# Patient Record
Sex: MALE | Race: WHITE | Employment: FULL TIME | URBAN - METROPOLITAN AREA
[De-identification: names, ages, dates, MRNs, and addresses within clinical notes are randomized per-mention and may not be internally consistent; named-entity substitution may affect disease eponyms.]

---

## 2020-09-16 ENCOUNTER — APPOINTMENT (OUTPATIENT)
Dept: GENERAL RADIOLOGY | Age: 40
DRG: 310 | End: 2020-09-16
Payer: COMMERCIAL

## 2020-09-16 ENCOUNTER — HOSPITAL ENCOUNTER (INPATIENT)
Age: 40
LOS: 1 days | Discharge: HOME | DRG: 310 | End: 2020-09-17
Attending: EMERGENCY MEDICINE | Admitting: FAMILY MEDICINE
Payer: COMMERCIAL

## 2020-09-16 PROBLEM — I48.91 ATRIAL FIBRILLATION WITH RAPID VENTRICULAR RESPONSE (HCC): Status: ACTIVE | Noted: 2020-09-16

## 2020-09-16 LAB
AMPHETAMINE SCREEN, URINE: NOT DETECTED
ANION GAP SERPL CALCULATED.3IONS-SCNC: 9 MMOL/L (ref 7–16)
BARBITURATE SCREEN URINE: NOT DETECTED
BASOPHILS ABSOLUTE: 0.05 E9/L (ref 0–0.2)
BASOPHILS RELATIVE PERCENT: 0.7 % (ref 0–2)
BENZODIAZEPINE SCREEN, URINE: NOT DETECTED
BUN BLDV-MCNC: 15 MG/DL (ref 6–20)
CALCIUM SERPL-MCNC: 9.3 MG/DL (ref 8.6–10.2)
CANNABINOID SCREEN URINE: NOT DETECTED
CHLORIDE BLD-SCNC: 105 MMOL/L (ref 98–107)
CO2: 24 MMOL/L (ref 22–29)
COCAINE METABOLITE SCREEN URINE: NOT DETECTED
CREAT SERPL-MCNC: 0.9 MG/DL (ref 0.7–1.2)
EKG ATRIAL RATE: 110 BPM
EKG Q-T INTERVAL: 306 MS
EKG QRS DURATION: 88 MS
EKG QTC CALCULATION (BAZETT): 460 MS
EKG R AXIS: 84 DEGREES
EKG T AXIS: 32 DEGREES
EKG VENTRICULAR RATE: 136 BPM
EOSINOPHILS ABSOLUTE: 0.09 E9/L (ref 0.05–0.5)
EOSINOPHILS RELATIVE PERCENT: 1.3 % (ref 0–6)
FENTANYL SCREEN, URINE: NOT DETECTED
GFR AFRICAN AMERICAN: >60
GFR NON-AFRICAN AMERICAN: >60 ML/MIN/1.73
GLUCOSE BLD-MCNC: 195 MG/DL (ref 74–99)
HCT VFR BLD CALC: 46.8 % (ref 37–54)
HEMOGLOBIN: 16.2 G/DL (ref 12.5–16.5)
IMMATURE GRANULOCYTES #: 0.02 E9/L
IMMATURE GRANULOCYTES %: 0.3 % (ref 0–5)
LYMPHOCYTES ABSOLUTE: 1.92 E9/L (ref 1.5–4)
LYMPHOCYTES RELATIVE PERCENT: 27.5 % (ref 20–42)
Lab: NORMAL
MAGNESIUM: 1.8 MG/DL (ref 1.6–2.6)
MCH RBC QN AUTO: 29.9 PG (ref 26–35)
MCHC RBC AUTO-ENTMCNC: 34.6 % (ref 32–34.5)
MCV RBC AUTO: 86.3 FL (ref 80–99.9)
METHADONE SCREEN, URINE: NOT DETECTED
MONOCYTES ABSOLUTE: 0.52 E9/L (ref 0.1–0.95)
MONOCYTES RELATIVE PERCENT: 7.4 % (ref 2–12)
NEUTROPHILS ABSOLUTE: 4.38 E9/L (ref 1.8–7.3)
NEUTROPHILS RELATIVE PERCENT: 62.8 % (ref 43–80)
OPIATE SCREEN URINE: NOT DETECTED
OXYCODONE URINE: NOT DETECTED
PDW BLD-RTO: 12 FL (ref 11.5–15)
PHENCYCLIDINE SCREEN URINE: NOT DETECTED
PHOSPHORUS: 4 MG/DL (ref 2.5–4.5)
PLATELET # BLD: 244 E9/L (ref 130–450)
PMV BLD AUTO: 11.4 FL (ref 7–12)
POTASSIUM REFLEX MAGNESIUM: 4 MMOL/L (ref 3.5–5)
PRO-BNP: 1509 PG/ML (ref 0–125)
RBC # BLD: 5.42 E12/L (ref 3.8–5.8)
SODIUM BLD-SCNC: 138 MMOL/L (ref 132–146)
TROPONIN: <0.01 NG/ML (ref 0–0.03)
TSH SERPL DL<=0.05 MIU/L-ACNC: 1.32 UIU/ML (ref 0.27–4.2)
WBC # BLD: 7 E9/L (ref 4.5–11.5)

## 2020-09-16 PROCEDURE — 99284 EMERGENCY DEPT VISIT MOD MDM: CPT

## 2020-09-16 PROCEDURE — 71045 X-RAY EXAM CHEST 1 VIEW: CPT

## 2020-09-16 PROCEDURE — 93005 ELECTROCARDIOGRAM TRACING: CPT | Performed by: EMERGENCY MEDICINE

## 2020-09-16 PROCEDURE — 83735 ASSAY OF MAGNESIUM: CPT

## 2020-09-16 PROCEDURE — 85025 COMPLETE CBC W/AUTO DIFF WBC: CPT

## 2020-09-16 PROCEDURE — 36415 COLL VENOUS BLD VENIPUNCTURE: CPT

## 2020-09-16 PROCEDURE — 2500000003 HC RX 250 WO HCPCS: Performed by: STUDENT IN AN ORGANIZED HEALTH CARE EDUCATION/TRAINING PROGRAM

## 2020-09-16 PROCEDURE — 96361 HYDRATE IV INFUSION ADD-ON: CPT

## 2020-09-16 PROCEDURE — 84100 ASSAY OF PHOSPHORUS: CPT

## 2020-09-16 PROCEDURE — 80048 BASIC METABOLIC PNL TOTAL CA: CPT

## 2020-09-16 PROCEDURE — 2580000003 HC RX 258: Performed by: FAMILY MEDICINE

## 2020-09-16 PROCEDURE — 80307 DRUG TEST PRSMV CHEM ANLYZR: CPT

## 2020-09-16 PROCEDURE — 99283 EMERGENCY DEPT VISIT LOW MDM: CPT

## 2020-09-16 PROCEDURE — 2060000000 HC ICU INTERMEDIATE R&B

## 2020-09-16 PROCEDURE — 84443 ASSAY THYROID STIM HORMONE: CPT

## 2020-09-16 PROCEDURE — 93005 ELECTROCARDIOGRAM TRACING: CPT | Performed by: INTERNAL MEDICINE

## 2020-09-16 PROCEDURE — 6370000000 HC RX 637 (ALT 250 FOR IP): Performed by: INTERNAL MEDICINE

## 2020-09-16 PROCEDURE — 2580000003 HC RX 258: Performed by: STUDENT IN AN ORGANIZED HEALTH CARE EDUCATION/TRAINING PROGRAM

## 2020-09-16 PROCEDURE — 84484 ASSAY OF TROPONIN QUANT: CPT

## 2020-09-16 PROCEDURE — 83880 ASSAY OF NATRIURETIC PEPTIDE: CPT

## 2020-09-16 PROCEDURE — APPSS60 APP SPLIT SHARED TIME 46-60 MINUTES: Performed by: NURSE PRACTITIONER

## 2020-09-16 PROCEDURE — 96374 THER/PROPH/DIAG INJ IV PUSH: CPT

## 2020-09-16 PROCEDURE — 99254 IP/OBS CNSLTJ NEW/EST MOD 60: CPT | Performed by: INTERNAL MEDICINE

## 2020-09-16 RX ORDER — SODIUM CHLORIDE 0.9 % (FLUSH) 0.9 %
10 SYRINGE (ML) INJECTION EVERY 12 HOURS SCHEDULED
Status: DISCONTINUED | OUTPATIENT
Start: 2020-09-16 | End: 2020-09-17 | Stop reason: HOSPADM

## 2020-09-16 RX ORDER — SODIUM CHLORIDE 0.9 % (FLUSH) 0.9 %
10 SYRINGE (ML) INJECTION PRN
Status: DISCONTINUED | OUTPATIENT
Start: 2020-09-16 | End: 2020-09-17 | Stop reason: HOSPADM

## 2020-09-16 RX ORDER — ACETAMINOPHEN 650 MG/1
650 SUPPOSITORY RECTAL EVERY 6 HOURS PRN
Status: DISCONTINUED | OUTPATIENT
Start: 2020-09-16 | End: 2020-09-17 | Stop reason: HOSPADM

## 2020-09-16 RX ORDER — 0.9 % SODIUM CHLORIDE 0.9 %
500 INTRAVENOUS SOLUTION INTRAVENOUS ONCE
Status: COMPLETED | OUTPATIENT
Start: 2020-09-16 | End: 2020-09-16

## 2020-09-16 RX ORDER — ACETAMINOPHEN 325 MG/1
650 TABLET ORAL EVERY 6 HOURS PRN
Status: DISCONTINUED | OUTPATIENT
Start: 2020-09-16 | End: 2020-09-17 | Stop reason: HOSPADM

## 2020-09-16 RX ORDER — POLYETHYLENE GLYCOL 3350 17 G/17G
17 POWDER, FOR SOLUTION ORAL DAILY PRN
Status: DISCONTINUED | OUTPATIENT
Start: 2020-09-16 | End: 2020-09-17 | Stop reason: HOSPADM

## 2020-09-16 RX ORDER — PROMETHAZINE HYDROCHLORIDE 25 MG/1
12.5 TABLET ORAL EVERY 6 HOURS PRN
Status: DISCONTINUED | OUTPATIENT
Start: 2020-09-16 | End: 2020-09-17 | Stop reason: HOSPADM

## 2020-09-16 RX ORDER — ONDANSETRON 2 MG/ML
4 INJECTION INTRAMUSCULAR; INTRAVENOUS EVERY 6 HOURS PRN
Status: DISCONTINUED | OUTPATIENT
Start: 2020-09-16 | End: 2020-09-17 | Stop reason: HOSPADM

## 2020-09-16 RX ORDER — DILTIAZEM HYDROCHLORIDE 5 MG/ML
0.25 INJECTION INTRAVENOUS ONCE
Status: COMPLETED | OUTPATIENT
Start: 2020-09-16 | End: 2020-09-16

## 2020-09-16 RX ORDER — DILTIAZEM HYDROCHLORIDE 120 MG/1
120 CAPSULE, COATED, EXTENDED RELEASE ORAL DAILY
Status: DISCONTINUED | OUTPATIENT
Start: 2020-09-16 | End: 2020-09-17 | Stop reason: HOSPADM

## 2020-09-16 RX ADMIN — SODIUM CHLORIDE 500 ML: 9 INJECTION, SOLUTION INTRAVENOUS at 11:21

## 2020-09-16 RX ADMIN — Medication 10 ML: at 21:00

## 2020-09-16 RX ADMIN — DILTIAZEM HYDROCHLORIDE 28.5 MG: 5 INJECTION INTRAVENOUS at 11:11

## 2020-09-16 RX ADMIN — DILTIAZEM HYDROCHLORIDE 120 MG: 120 CAPSULE, COATED, EXTENDED RELEASE ORAL at 17:31

## 2020-09-16 RX ADMIN — DEXTROSE MONOHYDRATE 5 MG/HR: 50 INJECTION, SOLUTION INTRAVENOUS at 13:15

## 2020-09-16 ASSESSMENT — ENCOUNTER SYMPTOMS
COUGH: 0
EYE PAIN: 0
EYE DISCHARGE: 0
RHINORRHEA: 0
SORE THROAT: 0
DIARRHEA: 0
BACK PAIN: 1
NAUSEA: 0
ABDOMINAL PAIN: 0
SHORTNESS OF BREATH: 0
VOMITING: 0
SINUS PRESSURE: 0
WHEEZING: 0
EYE REDNESS: 0
BACK PAIN: 0

## 2020-09-16 ASSESSMENT — PAIN SCALES - GENERAL
PAINLEVEL_OUTOF10: 0
PAINLEVEL_OUTOF10: 0

## 2020-09-16 NOTE — CONSULTS
Inpatient Cardiology Consultation      Reason for Consult: New onset of Afib    Consulting Physician: Dr. Blanca Perez    Requesting Physician:  Dr. Sangeetha Morales     Date of Consultation: 9/16/2020    HISTORY OF PRESENT ILLNESS:   Mr. Sue Garza is a 82-year-old obese male who is previously not known to [facility] cardiology physicians. PMH: Obesity; History of retinal hemorrhage (right eye) -->diagnosed in 4/2019 --->underwent a \"normal ECHO, negative Carotid US and unremarkable EKG\" per patient at the [facility] 4/2019-->patient is being referred to hematology to determine the etiology of retinal hemorrhage; Brief episode of AF with RVR (~ 4 years ago, noted on EKG at urgent care per patient-->he spontaneously converted to SR at that time and was started on \"medication\" (but patient did not fill prescription); probable ADRIAN. [facility] on 9/16/2020 with complaints of palpitations and his heart racing that started at approximately 10:30 AM on 9/15/2020 while he was sitting down at work. He reported \"my heart felt like a heavy quick rapid pounding. \" His discomfort continued until and was worsened if he walked around and slightly felt better at rest. He had constant symptoms until he presented to the ED on 9/16/2020 and was started on Cardizem IV gtt. On 9/15/2020, patient spoke to his father-in-law and he prescribed him Cardizem 30 mg 4 times per day. He took a total of 4 doses (last dose 0930 on 9/16/2020). Due to persistent symptoms, he presented to the ED. On arrival to the ED: Blood pressure 131/67, heart rate 105, afebrile, 97% on room air. Sodium 138, potassium 4.0, CO2 24, BUN 15, creatinine 0.9, proBNP 1509, troponin <0.01 x 1, WBC 7.0, H/H stable, platelet count 698. Urine tox screen: Negative    Please note: past medical records were reviewed per electronic medical record (EMR) - see detailed reports under Past Medical/ Surgical History. Past Medical History:    1. Probable ADRIAN  2. Obesity: BMI 37.8  3.  Lifelong nonsmoker   4  Hypothyroidism: On replacement therapy  5. History of retinal hemorrhage (right eye) -->diagnosed in 4/2019 --->underwent a \"normal ECHO, negative Carotid US and unremarkable EKG\" per patient at the [facility] 4/2019-->patient is being referred to hematology to determine the etiology of retinal hemorrhage. 6. Brief episode of AF with RVR (~ 4 years ago, noted on EKG at urgent care per patient-->he spontaneously converted to SR at that time and was started on \"medication\" (but patient did not fill prescription). Medications Prior to admit:  Prior to Admission medications    Medication Sig Start Date End Date Taking? Authorizing Provider   dilTIAZem (CARDIZEM) 30 MG tablet Take 30 mg by mouth 4 times daily   Yes Historical Provider, MD       Current Medications:    Current Facility-Administered Medications: dilTIAZem 100 mg in dextrose 5 % 100 mL infusion (ADD-Addison), 5 mg/hr, Intravenous, Continuous  sodium chloride flush 0.9 % injection 10 mL, 10 mL, Intravenous, 2 times per day  sodium chloride flush 0.9 % injection 10 mL, 10 mL, Intravenous, PRN  acetaminophen (TYLENOL) tablet 650 mg, 650 mg, Oral, Q6H PRN **OR** acetaminophen (TYLENOL) suppository 650 mg, 650 mg, Rectal, Q6H PRN  polyethylene glycol (GLYCOLAX) packet 17 g, 17 g, Oral, Daily PRN  promethazine (PHENERGAN) tablet 12.5 mg, 12.5 mg, Oral, Q6H PRN **OR** ondansetron (ZOFRAN) injection 4 mg, 4 mg, Intravenous, Q6H PRN  enoxaparin (LOVENOX) injection 40 mg, 40 mg, Subcutaneous, Daily    Allergies:  Patient has no known allergies. Social History:    Patient lives with his wife and his one year old child. Denies alcohol and illicit drug use  Lifelong nonsmoker  Caffeine intake: 3 - 12 oz coffees in the morning; 3 cans of soda in the evening. Works full-time as a contractor (desk job)    Family History:    Mother: Thyroid disease  Father: Hx of CAD s/p Stents (40's) and bypass (60's)     REVIEW OF SYSTEMS:     · Constitutional: + fatigue. Denies fevers, chills or night sweats  · Eyes: Denies visual changes or drainage  · ENT: Denies headaches or hearing loss. No mouth sores or sore throat. No epistaxis   · Cardiovascular: See HPI. No lower extremity swelling. · Respiratory: Denies LOPES, cough, orthopnea + PND (snores). No hemoptysis   · Gastrointestinal: Denies hematemesis or anorexia. No hematochezia or melena    · Genitourinary: Denies urgency, dysuria or hematuria. · Musculoskeletal: Denies gait disturbance, weakness or joint complaints  · Integumentary: Denies rash, hives or pruritis   · Neurological: Denies dizziness, headaches or seizures. No numbness or tingling  · Psychiatric: Denies anxiety or depression. · Endocrine: Denies temperature intolerance. No recent weight change. .  · Hematologic/Lymphatic: Denies abnormal bruising or bleeding. No swollen lymph nodes    PHYSICAL EXAM:   /76   Pulse 72   Temp 98.4 °F (36.9 °C) (Oral)   Resp 16   Ht 5' 9\" (1.753 m)   Wt 256 lb 4 oz (116.2 kg)   SpO2 95%   BMI 37.84 kg/m²   CONST:  Well developed, well nourished middle aged obese male who appears of stated age. Awake, alert and cooperative. No apparent distress. HEENT:   Head- Normocephalic, atraumatic   Eyes- Conjunctivae pink, anicteric  Throat- Oral mucosa pink and moist  Neck-  No stridor, trachea midline, no jugular venous distention. No carotid bruit. CHEST: Chest symmetrical and non-tender to palpation. No accessory muscle use or intercostal retractions  RESPIRATORY: Lung sounds - clear throughout fields   CARDIOVASCULAR:     Heart Inspection- shows no noted pulsations  Heart Palpation- no heaves or thrills; PMI is non-displaced   Heart Ausculation- Regular rate and rhythm, no murmur. No s3, s4 or rub   PV: No lower extremity edema. No varicosities. Pedal pulses palpable, no clubbing or cyanosis   ABDOMEN: Soft, obese, non-tender to light palpation. Bowel sounds present.  No palpable masses no organomegaly; no abdominal bruit  MS: Good muscle strength and tone. No atrophy or abnormal movements. : Deferred  SKIN: Warm and dry no statis dermatitis or ulcers   NEURO / PSYCH: Oriented to person, place and time. Speech clear and appropriate. Follows all commands. Pleasant affect     DATA:    ECG: See HPI. Tele strips: SR.   Diagnostic:    Labs:   CBC:   Recent Labs     09/16/20  1045   WBC 7.0   HGB 16.2   HCT 46.8        BMP:   Recent Labs     09/16/20  1045      K 4.0   CO2 24   BUN 15   CREATININE 0.9   LABGLOM >60   CALCIUM 9.3     proBNP:   Recent Labs     09/16/20  1045   PROBNP 1,509*       CARDIAC ENZYMES:  Recent Labs     09/16/20  [address] [place] <0.01     Chest x-ray: 9/16/2020  No acute cardiopulmonary findings. Assessment/plan as per Dr. Maritza Benjamin. Electronically signed by JASEN Mesa CNP on 9/16/20 at 2:26 PM EDT    ______________________________________________________________________  Cardiology attending attestation:  I have independently interviewed and examined the patient. I personally reviewed pertinent laboratory values and diagnostic testing (including, if applicable, chest xray, electrocardiogram, telemetry, echocardiogram, stress testing, and coronary angiography). I have reviewed the above documentation completed by JASEN Alfredo CNP including past medical, social, and family history and agree with the findings, assessment and plan except where noted. Plan formulated under my direct supervision. I participated in all aspects of the medical decision making.   Please see my additional contributions to the HPI, physical exam, and assessment / medical decision making:  _______________________________________________________________________    82-year-old male, history of retinal hemorrhage of the right eye, also history of a single episode of atrial fibrillation about 4 years ago with spontaneous cardioversion and no subsequent cardiac treatment are follow-up. Presents with abrupt onset of palpitations described as rapid and irregular heartbeat, felt the same as when he had A. fib several years ago. Father-in-law who is a physician prescribed a short acting diltiazem over the past 24 hours but did not help the symptoms, so he presented for evaluation. Found to be in A. fib with RVR with rates up to 130s. He was given IV diltiazem bolus and infusion, and after infusion started he converted to sinus rhythm. He feels fine at this time. No chest pain shortness of breath or palpitations. Wife states he snores heavily. No prior ADRIAN evaluation. Drinks several cups of coffees and cans of soda per day. Denies drug use. Thyroid function is normal.  Electrolytes within normal limits except magnesium borderline at 1.8. Physical Exam:  /76   Pulse 72   Temp 98.4 °F (36.9 °C) (Oral)   Resp 16   Ht 5' 9\" (1.753 m)   Wt 256 lb 4 oz (116.2 kg)   SpO2 95%   BMI 37.84 kg/m²   General appearance: Overweight young male, awake, alert, no acute respiratory distress  Skin: Intact, no rash  ENMT: Moist mucous membranes  Neck: Supple, no carotid bruits. Normal jugular venous pressure  Lungs: Clear to auscultation bilaterally. No wheezes, rales, or rhonchi  Cardiac: Regular rhythm with a normal rate, normal S1&S2, no murmurs apparent  Abdomen: Soft, positive bowel sounds, nontender  Extremities: Moves all extremities x 4, no lower extremity edema  Neurologic: No focal motor deficits apparent, normal mood and affect    Telemetry: Sinus rhythm  EKG: Atrial fibrillation 136 bpm.  Normal axis normal intervals. No ST or T wave changes. Impression:   1. Paroxysmal atrial fibrillation. Converted to sinus rhythm with IV diltiazem. [id number] Score 0. Second occurrence, initially diagnosed about 4 years ago with spontaneous cardioversion. 2. History of retinal hemorrhage  3. Possible ADRIAN  4. Obesity, BMI 37.8 kg/m²  5.  Family history premature CAD    Recommendations:  No clear precipitating cause of A. fib, thyroid function normal, electrolytes largely within normal limits, tox screen negative.  Low stroke risk, agree with no anticoagulation   Repeat EKG to document rhythm change   Stop IV diltiazem, start long-acting oral diltiazem to maintain sinus   Recommend outpatient sleep study to rule out sleep apnea as a contributing factor   Repeat 2D echocardiogram to rule out structural heart disease   Aggressive risk factor modification including weight loss and exercise, to reduce likelihood of recurrence of atrial fibrillation   Likely DC tomorrow with outpatient follow-up, I can see him in the office    Thank you for the consultation. Please do not hesitate to call with questions.     Viji Parham MD, 1949 [facility] Cardiology

## 2020-09-16 NOTE — H&P
Medication Sig Start Date End Date Taking? Authorizing Provider   dilTIAZem (CARDIZEM) 30 MG tablet Take 30 mg by mouth 4 times daily   Yes Historical Provider, MD        Allergies   Patient has no known allergies. Social History     Social History     Tobacco History     Smoking Status  Never Smoker    Smokeless Tobacco Use  Never Used          Alcohol History     Alcohol Use Status  Yes Comment  socuially           Drug Use     Drug Use Status  Never          Sexual Activity     Sexually Active  Not Asked                Family History     Family History   Problem Relation Age of Onset    High Blood Pressure Father     Coronary Art Dis Father 36       Review of Systems   Review of Systems   Constitutional: Negative for chills and fever. HENT: Negative for congestion and rhinorrhea. Eyes: Negative for pain and visual disturbance. Respiratory: Negative for shortness of breath and wheezing. Cardiovascular: Positive for palpitations. Negative for chest pain. Gastrointestinal: Negative for abdominal pain, nausea and vomiting. Musculoskeletal: Positive for back pain (chronic). Negative for gait problem. Skin: Negative for pallor and rash. Neurological: Negative for dizziness, light-headedness and headaches. Psychiatric/Behavioral: Negative for dysphoric mood. The patient is not nervous/anxious. Physical Exam   /76   Pulse 72   Temp 98.4 °F (36.9 °C) (Oral)   Resp 16   Ht 5' 9\" (1.753 m)   Wt 256 lb 4 oz (116.2 kg)   SpO2 95%   BMI 37.84 kg/m²     Physical Exam  Constitutional:       General: He is not in acute distress. Appearance: Normal appearance. He is obese. HENT:      Head: Normocephalic and atraumatic. Eyes:      General:         Right eye: No discharge. Left eye: No discharge. Conjunctiva/sclera: Conjunctivae normal.   Neck:      Musculoskeletal: Normal range of motion. No neck rigidity.    Cardiovascular:      Rate and Rhythm: Normal rate and regular rhythm. Heart sounds: No murmur. Comments: Upon exam, regular rhythm auscultated with rate of 76 bpm and regular per monitor  Pulmonary:      Effort: Pulmonary effort is normal.      Breath sounds: Normal breath sounds. No wheezing. Abdominal:      General: Bowel sounds are normal.      Palpations: Abdomen is soft. Tenderness: There is no abdominal tenderness. Musculoskeletal: Normal range of motion. Right lower leg: No edema. Left lower leg: No edema. Skin:     General: Skin is warm and dry. Coloration: Skin is not pale. Neurological:      Mental Status: He is alert and oriented to person, place, and time. Mental status is at baseline.          Labs      Recent Results (from the past 24 hour(s))   EKG 12 Lead    Collection Time: 09/16/20 10:29 AM   Result Value Ref Range    Ventricular Rate 136 BPM    Atrial Rate 110 BPM    QRS Duration 88 ms    Q-T Interval 306 ms    QTc Calculation (Bazett) 460 ms    R Axis 84 degrees    T Axis 32 degrees   CBC Auto Differential    Collection Time: 09/16/20 10:45 AM   Result Value Ref Range    WBC 7.0 4.5 - 11.5 E9/L    RBC 5.42 3.80 - 5.80 E12/L    Hemoglobin 16.2 12.5 - 16.5 g/dL    Hematocrit 46.8 37.0 - 54.0 %    MCV 86.3 80.0 - 99.9 fL    MCH 29.9 26.0 - 35.0 pg    MCHC 34.6 (H) 32.0 - 34.5 %    RDW 12.0 11.5 - 15.0 fL    Platelets 822 765 - 164 E9/L    MPV 11.4 7.0 - 12.0 fL    Neutrophils % 62.8 43.0 - 80.0 %    Immature Granulocytes % 0.3 0.0 - 5.0 %    Lymphocytes % 27.5 20.0 - 42.0 %    Monocytes % 7.4 2.0 - 12.0 %    Eosinophils % 1.3 0.0 - 6.0 %    Basophils % 0.7 0.0 - 2.0 %    Neutrophils Absolute 4.38 1.80 - 7.30 E9/L    Immature Granulocytes # 0.02 E9/L    Lymphocytes Absolute 1.92 1.50 - 4.00 E9/L    Monocytes Absolute 0.52 0.10 - 0.95 E9/L    Eosinophils Absolute 0.09 0.05 - 0.50 E9/L    Basophils Absolute 0.05 0.00 - 0.20 I6/C   Basic Metabolic Panel w/ Reflex to MG    Collection Time: 09/16/20 10:45 AM   Result ventricular response  Second time patient has felt this, 4 years of no symptoms with multiple normal EKGs between episodes  -patient takes no medications regularly  -proBNP mildly elevated  -Troponin <0.01  -UDS negative  -EKG in ED showed Afib RVR  -CXR without acute abnormality  -TSH ordered  -Mg and Phosphorus levels ordered   -Continue Cardizem drip  -Telemetry monitoring  -Cardiology consulted    Consultations Ordered:  IP CONSULT TO CARDIOLOGY    DVT ppx: lovenox  Diet: General and Low Fat  Code: Full    Case discussed with attending physician Dr. Alvarado Childress.      Electronically signed by Ector Colunga DO on 9/16/20 at 1:53 PM EDT

## 2020-09-16 NOTE — CARE COORDINATION
CM spoke with pt by phone to discuss role, anticipated LOS & current plan of care. Reports living with wife in 1 story home. Is independent & does not use equipment or O2 & is not diabetic. Pt is a  & follows with [facility] on [address] [facility] for care. No hx RUPAL or HHC. Discussed dx, has been to [facility] in the past. Pt states he had an episode of atrial fib 4 years ago but it only lasted 5hrs. Has not been on medication for AF as he has had no other incidents until now. Pharmacy is [facility]. Plan is home with wife when ready for discharge. CM & SW will continue to follow pt. VM left at [facility] to provide notice of admission.

## 2020-09-16 NOTE — ED PROVIDER NOTES
[facility]  Department of Emergency Medicine     Written by: Dakota Bills DO  Patient Name: Tori Anderson  Attending Provider: Marshal Kamara DO  Admit Date: 2020 10:24 AM  MRN: [id number]                   : 1980        Chief Complaint   Patient presents with    Irregular Heart Beat     onset yesterday 21     - Chief complaint    Patient is a 77-year-old male no significant past medical history. Patient presents due to irregular heartbeat. Patient states that around yesterday morning he noticed that his heart was beating fast and he is beginning to feel palpitations. He also notes that his heart felt  \"irregular\" at that time. Patient states that he talked with his father who is a physician who started him on Cardizem 30 mg 4 times daily. In addition patient notes that this was not necessary the first time he is been told that he may have A. fib he states that he saw his primary care doctor at the [address] [place] [address] a few years ago and was told that he may be in A. fib but no treatment or work-up was done at that time. Patient patient denies any fever, chills, nausea, vomiting, chest pain, cough or shortness of breath. The history is provided by the patient. No  was used. Review of Systems   Constitutional: Negative for chills and fever. HENT: Negative for ear pain, sinus pressure and sore throat. Eyes: Negative for pain, discharge and redness. Respiratory: Negative for cough, shortness of breath and wheezing. Cardiovascular: Positive for palpitations. Negative for chest pain. Gastrointestinal: Negative for abdominal pain, diarrhea, nausea and vomiting. Genitourinary: Negative for dysuria and frequency. Musculoskeletal: Negative for arthralgias and back pain. Skin: Negative for rash and wound. Neurological: Negative for weakness and headaches. Hematological: Negative for adenopathy. All other systems reviewed and are negative. Physical Exam  Vitals signs and nursing note reviewed. Constitutional:       Appearance: He is well-developed. HENT:      Head: Normocephalic and atraumatic. Eyes:      Conjunctiva/sclera: Conjunctivae normal.   Neck:      Musculoskeletal: Normal range of motion and neck supple. Cardiovascular:      Rate and Rhythm: Tachycardia present. Rhythm irregular. Heart sounds: Normal heart sounds. No murmur. Pulmonary:      Effort: Pulmonary effort is normal. No respiratory distress. Breath sounds: Normal breath sounds. No wheezing or rales. Abdominal:      General: Bowel sounds are normal.      Palpations: Abdomen is soft. Tenderness: There is no abdominal tenderness. There is no guarding or rebound. Musculoskeletal:         General: No tenderness or deformity. Skin:     General: Skin is warm and dry. Neurological:      Mental Status: He is alert and oriented to person, place, and time. Cranial Nerves: No cranial nerve deficit. Coordination: Coordination normal.          Procedures       MDM  Number of Diagnoses or Management Options  Atrial fibrillation with RVR [facility]):   Diagnosis management comments: Patient is a 61-year-old male no significant past medical history. Patient presents with 1 day history of heart palpitations. Vital signs stable and physical exam patient appears to be irregular heart rhythm. Laboratory results demonstrate a mildly elevated proBNP of 1500. Chest x-ray was obtained without acute abnormalities. EKG was obtained which demonstrated rate of 136 atrial fibrillation with appendicular sponsor the rate of 136 no acute ST segment changes noted. Patient was originally given 0.25 mg/kg of Cardizem after which patient remained with elevated heart rate. At that time decision was made to begin patient on Cardizem drip at 5 mg/hr. Case discussed Dr. Gwen Dsouza who has agreed to admit patient.   Plan of care discussed with patient including admission, patient PROGRESS NOTES ------------------------------------------  Re-evaluation(s):  Time: 9962  Patients symptoms show no change  Repeat physical examination is not changed    Counseling:  I have spoken with the patient and discussed todays results, in addition to providing specific details for the plan of care and counseling regarding the diagnosis and prognosis. Their questions are answered at this time and they are agreeable with the plan of admission.    --------------------------------- ADDITIONAL PROVIDER NOTES ---------------------------------  Consultations:  Time: 1250. Spoke with Dr. Lysle Cogan. Discussed case. They will admit the patient. This patient's ED course included: a personal history and physicial examination, re-evaluation prior to disposition, multiple bedside re-evaluations, IV medications and cardiac monitoring    This patient has remained hemodynamically stable during their ED course. Diagnosis:  1. Atrial fibrillation with RVR (HCC)        Disposition:  Patient's disposition: Admit to telemetry  Patient's condition is stable. Patient was seen and evaluated by myself and my attending Irene Wu DO. Assessment and Plan discussed with attending provider, please see attestation for final plan of care.      Syeda Diallo DO        [place],   Resident  09/16/20 0855

## 2020-09-17 ENCOUNTER — TELEPHONE (OUTPATIENT)
Dept: CARDIOLOGY CLINIC | Age: 40
End: 2020-09-17

## 2020-09-17 VITALS
HEIGHT: 69 IN | DIASTOLIC BLOOD PRESSURE: 86 MMHG | OXYGEN SATURATION: 98 % | WEIGHT: 253.2 LBS | RESPIRATION RATE: 18 BRPM | SYSTOLIC BLOOD PRESSURE: 126 MMHG | BODY MASS INDEX: 37.5 KG/M2 | HEART RATE: 70 BPM | TEMPERATURE: 97.8 F

## 2020-09-17 PROBLEM — I48.91 ATRIAL FIBRILLATION WITH RAPID VENTRICULAR RESPONSE (HCC): Status: RESOLVED | Noted: 2020-09-16 | Resolved: 2020-09-17

## 2020-09-17 LAB
ANION GAP SERPL CALCULATED.3IONS-SCNC: 9 MMOL/L (ref 7–16)
BUN BLDV-MCNC: 12 MG/DL (ref 6–20)
CALCIUM SERPL-MCNC: 9.2 MG/DL (ref 8.6–10.2)
CHLORIDE BLD-SCNC: 101 MMOL/L (ref 98–107)
CO2: 24 MMOL/L (ref 22–29)
CREAT SERPL-MCNC: 0.7 MG/DL (ref 0.7–1.2)
EKG ATRIAL RATE: 78 BPM
EKG P AXIS: 23 DEGREES
EKG P-R INTERVAL: 210 MS
EKG Q-T INTERVAL: 388 MS
EKG QRS DURATION: 94 MS
EKG QTC CALCULATION (BAZETT): 442 MS
EKG R AXIS: 23 DEGREES
EKG T AXIS: 18 DEGREES
EKG VENTRICULAR RATE: 78 BPM
GFR AFRICAN AMERICAN: >60
GFR NON-AFRICAN AMERICAN: >60 ML/MIN/1.73
GLUCOSE BLD-MCNC: 121 MG/DL (ref 74–99)
HBA1C MFR BLD: 6.2 % (ref 4–5.6)
LV EF: 63 %
LVEF MODALITY: NORMAL
POTASSIUM REFLEX MAGNESIUM: 3.8 MMOL/L (ref 3.5–5)
PRO-BNP: 224 PG/ML (ref 0–125)
SODIUM BLD-SCNC: 134 MMOL/L (ref 132–146)

## 2020-09-17 PROCEDURE — 6370000000 HC RX 637 (ALT 250 FOR IP): Performed by: INTERNAL MEDICINE

## 2020-09-17 PROCEDURE — 83880 ASSAY OF NATRIURETIC PEPTIDE: CPT

## 2020-09-17 PROCEDURE — 2580000003 HC RX 258: Performed by: FAMILY MEDICINE

## 2020-09-17 PROCEDURE — 93306 TTE W/DOPPLER COMPLETE: CPT

## 2020-09-17 PROCEDURE — 83036 HEMOGLOBIN GLYCOSYLATED A1C: CPT

## 2020-09-17 PROCEDURE — 36415 COLL VENOUS BLD VENIPUNCTURE: CPT

## 2020-09-17 PROCEDURE — 80048 BASIC METABOLIC PNL TOTAL CA: CPT

## 2020-09-17 PROCEDURE — 99222 1ST HOSP IP/OBS MODERATE 55: CPT | Performed by: FAMILY MEDICINE

## 2020-09-17 PROCEDURE — 99232 SBSQ HOSP IP/OBS MODERATE 35: CPT | Performed by: INTERNAL MEDICINE

## 2020-09-17 RX ORDER — DILTIAZEM HYDROCHLORIDE 120 MG/1
120 CAPSULE, COATED, EXTENDED RELEASE ORAL DAILY
Qty: 30 CAPSULE | Refills: 3 | Status: SHIPPED | OUTPATIENT
Start: 2020-09-18

## 2020-09-17 RX ADMIN — Medication 10 ML: at 09:12

## 2020-09-17 RX ADMIN — DILTIAZEM HYDROCHLORIDE 120 MG: 120 CAPSULE, COATED, EXTENDED RELEASE ORAL at 09:12

## 2020-09-17 NOTE — PROGRESS NOTES
[facility] [address]  Progress Note    Chief complaint :  Chief Complaint   Patient presents with    Irregular Heart Beat     onset yesterday 1030       Subjective:    Patient states his palpitations have resolved. Denies any SOB, CP, lightheadedness, or dizziness. Past medical, surgical, family and social history were reviewed, non-contributory, and unchanged unless otherwise stated. Review of Systems - as above    Objective:  /83   Pulse 75   Temp 97.8 °F (36.6 °C) (Oral)   Resp 16   Ht 5' 9\" (1.753 m)   Wt 253 lb 3.2 oz (114.9 kg)   SpO2 95%   BMI 37.39 kg/m²     Physical Exam  Constitutional:       Appearance: He is well-developed. HENT:      Head: Normocephalic and atraumatic. Eyes:      General:         Right eye: No discharge. Left eye: No discharge. Conjunctiva/sclera: Conjunctivae normal.   Neck:      Musculoskeletal: Normal range of motion and neck supple. Trachea: No tracheal deviation. Cardiovascular:      Rate and Rhythm: Normal rate and regular rhythm. Heart sounds: Normal heart sounds. Pulmonary:      Effort: Pulmonary effort is normal. No respiratory distress. Breath sounds: Normal breath sounds. No wheezing. Abdominal:      General: Bowel sounds are normal. There is no distension. Palpations: Abdomen is soft. Tenderness: There is no abdominal tenderness. Musculoskeletal:         General: No tenderness. Skin:     General: Skin is warm and dry. Neurological:      Mental Status: He is alert. Cranial Nerves: No cranial nerve deficit.    Psychiatric:         Behavior: Behavior normal.         Labs:  Recent Results (from the past 24 hour(s))   EKG 12 Lead    Collection Time: 09/16/20 10:29 AM   Result Value Ref Range    Ventricular Rate 136 BPM    Atrial Rate 110 BPM    QRS Duration 88 ms    Q-T Interval 306 ms    QTc Calculation (Bazett) 460 ms    R Axis 84 degrees    T Axis 32 degrees   CBC Auto Differential    Collection Time: 09/16/20 10:45 AM   Result Value Ref Range    WBC 7.0 4.5 - 11.5 E9/L    RBC 5.42 3.80 - 5.80 E12/L    Hemoglobin 16.2 12.5 - 16.5 g/dL    Hematocrit 46.8 37.0 - 54.0 %    MCV 86.3 80.0 - 99.9 fL    MCH 29.9 26.0 - 35.0 pg    MCHC 34.6 (H) 32.0 - 34.5 %    RDW 12.0 11.5 - 15.0 fL    Platelets 812 256 - 738 E9/L    MPV 11.4 7.0 - 12.0 fL    Neutrophils % 62.8 43.0 - 80.0 %    Immature Granulocytes % 0.3 0.0 - 5.0 %    Lymphocytes % 27.5 20.0 - 42.0 %    Monocytes % 7.4 2.0 - 12.0 %    Eosinophils % 1.3 0.0 - 6.0 %    Basophils % 0.7 0.0 - 2.0 %    Neutrophils Absolute 4.38 1.80 - 7.30 E9/L    Immature Granulocytes # 0.02 E9/L    Lymphocytes Absolute 1.92 1.50 - 4.00 E9/L    Monocytes Absolute 0.52 0.10 - 0.95 E9/L    Eosinophils Absolute 0.09 0.05 - 0.50 E9/L    Basophils Absolute 0.05 0.00 - 0.20 Y0/Y   Basic Metabolic Panel w/ Reflex to MG    Collection Time: 09/16/20 10:45 AM   Result Value Ref Range    Sodium 138 132 - 146 mmol/L    Potassium reflex Magnesium 4.0 3.5 - 5.0 mmol/L    Chloride 105 98 - 107 mmol/L    CO2 24 22 - 29 mmol/L    Anion Gap 9 7 - 16 mmol/L    Glucose 195 (H) 74 - 99 mg/dL    BUN 15 6 - 20 mg/dL    CREATININE 0.9 0.7 - 1.2 mg/dL    GFR Non-African American >60 >=60 mL/min/1.73    GFR African American >60     Calcium 9.3 8.6 - 10.2 mg/dL   Troponin    Collection Time: 09/16/20 10:45 AM   Result Value Ref Range    Troponin <0.01 0.00 - 0.03 ng/mL   Brain Natriuretic Peptide    Collection Time: 09/16/20 10:45 AM   Result Value Ref Range    Pro-BNP 1,509 (H) 0 - 125 pg/mL   Urine Drug Screen    Collection Time: 09/16/20  1:10 PM   Result Value Ref Range    Amphetamine Screen, Urine NOT DETECTED Negative <1000 ng/mL    Barbiturate Screen, Ur NOT DETECTED Negative < 200 ng/mL    Benzodiazepine Screen, Urine NOT DETECTED Negative < 200 ng/mL    Cannabinoid Scrn, Ur NOT DETECTED Negative < 50ng/mL    Cocaine Metabolite Screen, Urine NOT DETECTED Negative < 300 ng/mL    Opiate Scrn, Ur NOT DETECTED Negative < 300ng/mL    PCP Screen, Urine NOT DETECTED Negative < 25 ng/mL    Methadone Screen, Urine NOT DETECTED Negative <300 ng/mL    Oxycodone Urine NOT DETECTED Negative <100 ng/mL    FENTANYL SCREEN, URINE NOT DETECTED Negative <1 ng/mL    Drug Screen Comment: see below    PHOSPHORUS    Collection Time: 09/16/20  4:21 PM   Result Value Ref Range    Phosphorus 4.0 2.5 - 4.5 mg/dL   Magnesium    Collection Time: 09/16/20  4:21 PM   Result Value Ref Range    Magnesium 1.8 1.6 - 2.6 mg/dL   TSH WITHOUT REFLEX    Collection Time: 09/16/20  4:21 PM   Result Value Ref Range    TSH 1.320 0.270 - 4.200 uIU/mL       Radiology and other tests reviewed:  XR CHEST PORTABLE   Final Result   No acute cardiopulmonary findings.                 Assessment:  Active Hospital Problems    Diagnosis Date Noted    Atrial fibrillation with rapid ventricular response ([address] [I48.91] 09/16/2020       Plan:  Atrial fibrillation with rapid ventricular response  Converted in ED before patient was taken up to floor, palpitations have resolved   -Vitals stable  -no anticoag as chadsvasc is 0  -Oral diltiazem  -cards consulted - echo this am, dc if normal with outpt sleep study     Consultations Ordered:  IP CONSULT TO CARDIOLOGY     DVT ppx: lovenox  Diet: General and Low Fat  Code: Full    Haley Rosario DO  Family Medicine Resident PGY-3  09/17/20   6:15 AM

## 2020-09-17 NOTE — DISCHARGE SUMMARY
Patient ID:  Bishop Hurley  [id number]  3 y.o.  1980    Admit date: 9/16/2020    Discharge date and time:  09/17/20 11:11 AM     Admission Diagnoses:   Patient Active Problem List   Diagnosis    Retinal hemorrhage       Discharge Diagnoses: Afib with RVR - spontaneously converted     Consults: cardiology    Procedures: 2D Echo    Hospital Course: John Quinonez presented with palpitations and was found to be in Afib with RVR. Cardiology was consulted and he underwent a 2D echo. The Afib converted spontaneously. The echo showed only a dilated right atrium with normal EF and no other pathology. Cardiology will follow up with him in the office and he will go home on 120mg of Oral Cardizem. He will have an outpatient sleep study scheduled as well. Post-Discharge Follow Up Issues: Follow up with Cardiology, Follow up with outpatient Sleep study    Discharge Exam:  See progress note from today    Discharge Condition: good    Disposition: home    Patient Instructions:   Current Discharge Medication List      START taking these medications    Details   dilTIAZem (CARDIZEM CD) 120 MG extended release capsule Take 1 capsule by mouth daily  Qty: 30 capsule, Refills: 3         STOP taking these medications       dilTIAZem (CARDIZEM) 30 MG tablet Comments:   Reason for Stopping:             Activity: activity as tolerated  Diet: regular diet    Follow-up with PCP in 1 week. Follow up with Cardiology. Signed:   Luis Kline MD  9/17/2020  11:11 AM

## 2020-09-17 NOTE — PROGRESS NOTES
Ok to discharge per Cardiology-Perfect Serve message sent to Dr. Justino Graham for discharge planning-see orders.   Electronically signed by Siddhartha Graham RN on 9/17/2020 at 11:24 AM

## 2020-09-17 NOTE — PROGRESS NOTES
INPATIENT CARDIOLOGY FOLLOW-UP    Name: Erica Hood    Age: 42 y.o. Date of Admission: 9/16/2020 10:24 AM    Date of Service: 9/17/2020    Primary Cardiologist: Known to me from this admission    Chief Complaint: Follow-up for paroxysmal atrial fibrillation    Interim History:  Feels well. Denies chest pain, shortness of breath or palpitations. Has remained in sinus rhythm since admission. Has had several episodes of ventricular triplets. Echo reviewed today showed normal LV systolic function with mild LVH, and mild to moderately enlarged right atrium. There was no significant valvular disease.     Thyroid function normal    Review of Systems:   Negative except as described above    Problem List:  Patient Active Problem List   Diagnosis    Retinal hemorrhage    Atrial fibrillation with RVR (HCC)       Current Medications:    Current Facility-Administered Medications:     sodium chloride flush 0.9 % injection 10 mL, 10 mL, Intravenous, 2 times per day, Samra SANDRA Fraire, DO, 10 mL at 09/17/20 0912    sodium chloride flush 0.9 % injection 10 mL, 10 mL, Intravenous, PRN, Samra Fraire, DO    acetaminophen (TYLENOL) tablet 650 mg, 650 mg, Oral, Q6H PRN **OR** acetaminophen (TYLENOL) suppository 650 mg, 650 mg, Rectal, Q6H PRN, Samra SANDRA Fraire, DO    polyethylene glycol (GLYCOLAX) packet 17 g, 17 g, Oral, Daily PRN, Samra SANDRA Fraire, DO    promethazine (PHENERGAN) tablet 12.5 mg, 12.5 mg, Oral, Q6H PRN **OR** ondansetron (ZOFRAN) injection 4 mg, 4 mg, Intravenous, Q6H PRN, Samra SANDRA Fraire, DO    enoxaparin (LOVENOX) injection 40 mg, 40 mg, Subcutaneous, Daily, Samra SANDRA Fraire, DO    perflutren lipid microspheres (DEFINITY) injection 1.65 mg, 1.5 mL, Intravenous, ONCE PRN, JASEN Lopez CNP    dilTIAZem (CARDIZEM CD) extended release capsule 120 mg, 120 mg, Oral, Daily, Mahamed Martinez MD, 120 mg at 09/17/20 0912    Physical Exam:  /86   Pulse 70   Temp 97.8 °F (36.6 °C) Resp 18   Ht 5' 9\" (1.753 m)   Wt 253 lb 3.2 oz (114.9 kg)   SpO2 98%   BMI 37.39 kg/m²   Wt Readings from Last 3 Encounters:   09/17/20 253 lb 3.2 oz (114.9 kg)     Appearance: Awake, alert, no acute respiratory distress  Skin: Intact, no rash  Head: Normocephalic, atraumatic  Eyes: EOMI, no conjunctival erythema  ENMT: No pharyngeal erythema, MMM, no rhinorrhea  Neck: Supple, no elevated JVP, no carotid bruits  Lungs: Clear to auscultation bilaterally. No wheezes, rales, or rhonchi. Cardiac: PMI nondisplaced, Regular rhythm with a normal rate, S1 & S2 normal, no murmurs  Abdomen: Soft, nontender, +bowel sounds  Extremities: Moves all extremities x 4, no lower extremity edema  Neurologic: No focal motor deficits apparent, normal mood and affect  Peripheral Pulses: Intact posterior tibial pulses bilaterally    Intake/Output:    Intake/Output Summary (Last 24 hours) at 9/17/2020 1056  Last data filed at 9/16/2020 2000  Gross per 24 hour   Intake 240 ml   Output --   Net 240 ml     No intake/output data recorded. Laboratory Tests:  Recent Labs     09/16/20  1045      K 4.0      CO2 24   BUN 15   CREATININE 0.9   GLUCOSE 195*   CALCIUM 9.3     Lab Results   Component Value Date    MG 1.8 09/16/2020     No results for input(s): ALKPHOS, ALT, AST, PROT, BILITOT, BILIDIR, LABALBU in the last 72 hours. Recent Labs     09/16/20  1045   WBC 7.0   RBC 5.42   HGB 16.2   HCT 46.8   MCV 86.3   MCH 29.9   MCHC 34.6*   RDW 12.0      MPV 11.4     Lab Results   Component Value Date    TROPONINI <0.01 09/16/2020     No results found for: INR, PROTIME  Lab Results   Component Value Date    TSH 1.320 09/16/2020       Recent Labs     09/16/20  1045   PROBNP 1,509*       Cardiac Tests:    EKG: Atrial fibrillation 136 bpm.  Normal axis normal intervals. No ST or T wave changes. Repeat EKG sinus rhythm 70 bpm borderline first-degree AV block. Normal axis. No ST-T wave changes.     Telemetry: Sinus rhythm, multiple episodes of ventricular triplets    Chest X-ray: 9/16/2020  Heart and pulmonary vascularity normal. Lungs clear. Costophrenic   angles sharp. Normal aorta.          Impression:         No acute cardiopulmonary findings. Echocardiogram:   Transthoracic echo 9/17/2020   Summary   Normal left ventricular size and systolic function. Ejection fraction is visually estimated at 60-65%. Normal diastolic function. No regional wall motion abnormalities seen. Mild left ventricular concentric hypertrophy noted. Normal right ventricular size and function. Moderately enlarged right atrium. Stress test:  na    Cardiac catheterization: na    ----------------------------------------------------------------------------------------------------------------------------------------------------------------  IMPRESSION:  1. Paroxysmal atrial fibrillation. Converted to sinus rhythm with IV diltiazem and maintaining. [id number] Score 0. Second occurrence, initially diagnosed about 4 years ago with spontaneous conversion at that time. 2. NSVT/ventricular triplets  3. History of retinal hemorrhage  4. Possible ADIRAN  5. Obesity, BMI 37.8 kg/m²  6. Family history premature CAD    RECOMMENDATIONS:  No clear precipitating cause of A. fib. Thyroid function is normal, electrolytes within normal limits, tach screen negative. Right atrial enlargement noted on echo. Otherwise no significant structural heart disease.      Low stroke risk, no anticoagulation required at this time   Continue long-acting oral diltiazem 120 mg daily   Outpatient sleep study to rule out sleep apnea   My office will arrange 7-day cardiac monitor as outpatient given frequent ventricular triplets and to assess for any other atrial arrhythmias   Aggressive risk factor modification including weight loss and exercise, to reduce likelihood of recurrence of atrial fibrillation   Counseled to moderate caffeine and alcohol intake  Rogelio Monterroso for discharge from cardiology standpoint   I will follow-up with him in the office    Tiana Hatch MD, [address] [facility]    NOTE: This report was transcribed using voice recognition software. Every effort was made to ensure accuracy; however, inadvertent computerized transcription errors may be present.

## 2020-09-17 NOTE — CARE COORDINATION
CM NOTE: Per QFR--- echo pending. No plan for [address] medication at discharge. Plan remains home. No needs.

## 2020-09-17 NOTE — PROGRESS NOTES
[facility] Quality Flow/Interdisciplinary Rounds Progress Note        Quality Flow Rounds held on September 17, 2020    Disciplines Attending:  Bedside Nurse, ,  and Nursing Unit Leadership    Jennifer Bai was admitted on 9/16/2020 10:24 AM    Anticipated Discharge Date:  Expected Discharge Date: 09/18/20    Disposition:    Gianluca Score:  Gianluca Scale Score: 22    Readmission Risk              Risk of Unplanned Readmission:        8           Discussed patient goal for the day, patient clinical progression, and barriers to discharge. The following Goal(s) of the Day/Commitment(s) have been identified:  Echocardiogram-possible discharge, check Cardiology plan.       John Horton  September 17, 2020

## 2020-09-23 ENCOUNTER — OFFICE VISIT (OUTPATIENT)
Dept: FAMILY MEDICINE CLINIC | Age: 40
End: 2020-09-23
Payer: COMMERCIAL

## 2020-09-23 VITALS
DIASTOLIC BLOOD PRESSURE: 86 MMHG | HEART RATE: 72 BPM | HEIGHT: 69 IN | TEMPERATURE: 98.4 F | SYSTOLIC BLOOD PRESSURE: 134 MMHG | BODY MASS INDEX: 37.8 KG/M2 | WEIGHT: 255.2 LBS

## 2020-09-23 PROCEDURE — 90686 IIV4 VACC NO PRSV 0.5 ML IM: CPT | Performed by: FAMILY MEDICINE

## 2020-09-23 PROCEDURE — 99213 OFFICE O/P EST LOW 20 MIN: CPT | Performed by: FAMILY MEDICINE

## 2020-09-23 PROCEDURE — 90471 IMMUNIZATION ADMIN: CPT | Performed by: FAMILY MEDICINE

## 2020-09-23 ASSESSMENT — ENCOUNTER SYMPTOMS
BACK PAIN: 1
WHEEZING: 0
VOMITING: 0
SHORTNESS OF BREATH: 0
DIARRHEA: 0
NAUSEA: 0

## 2020-09-23 ASSESSMENT — PATIENT HEALTH QUESTIONNAIRE - PHQ9
2. FEELING DOWN, DEPRESSED OR HOPELESS: 0
SUM OF ALL RESPONSES TO PHQ QUESTIONS 1-9: 0
SUM OF ALL RESPONSES TO PHQ QUESTIONS 1-9: 0
1. LITTLE INTEREST OR PLEASURE IN DOING THINGS: 0
SUM OF ALL RESPONSES TO PHQ9 QUESTIONS 1 & 2: 0

## 2020-09-23 NOTE — PROGRESS NOTES
[facility]  Precepting Note    Subjective:  43 yo M here for hosptial f/u - was admitted for afib wt RVR. Discharged on cardzem 120 mg ED. He has f/u with cardiology in October. He has chronic low back pain. Previously in the [employer] - h/o tinnitus. [id number] Score for Atrial Fibrillation Stroke Risk   Risk   Factors  Component Value   C CHF No 0   H HTN No 0   A2 Age >= 76 No,  (42 y.o.) 0   D DM No 0   S2 Prior Stroke/TIA No 0   V Vascular Disease No 0   A Age 74-69 No,  (43 y.o.) 0   Sc Sex male 0    [id number]  Score  0   Score last updated 9/23/20 0:08 PM EDT    Click here for a link to the UpToDate guideline \"Atrial Fibrillation: Anticoagulation therapy to prevent embolization    Disclaimer: Risk Score calculation is dependent on accuracy of patient problem list and past encounter diagnosis. ROS otherwise negative     Past medical, surgical, family and social history were reviewed, non-contributory, and unchanged unless otherwise stated. Objective:    /86   Pulse 72   Temp 98.4 °F (36.9 °C) (Temporal)   Ht 5' 9\" (1.753 m)   Wt 255 lb 3.2 oz (115.8 kg)   BMI 37.69 kg/m²     Exam is as noted by resident     Assessment/Plan:  Recent episode of Afib  Low back pain  Tinnitus - chronic  Note mildly elevated blood pressure - discuss lifestyle changes - gradual weight loss through diet, physical activty as tolerated  Predabetes with aic 6.2 on labs 9/17 / 20 - diet/ exercise  F/u in 6 months  Check flp for CV risk stratification      Attending Physician Statement  I have reviewed the chart, including any radiology or labs. I have discussed the case, including pertinent history and exam findings with the resident. I agree with the assessment, plan and orders as documented by the resident. Please refer to the resident note for additional information.       Electronically signed by Melissa Schaefer MD on 9/23/2020 at 4:22 PM

## 2020-09-23 NOTE — PROGRESS NOTES
9/23/2020    Juani Willingham is a 42 y.o. male here for:    HPI:    Afib - Resolved. Was in the hospital and this spontaneously converted. He feels when it starts and when it stops. Has not had the sensation since discharge. Is following with Cardiology in mid October. No anti-coag as CHADSVASC is 0. Chronic low back pain - RFAs were done every 6 months but they stopped working. Was seeing back specialist through the [place] but was referred to Chiropractics and stopped seeing them as it was not helping. Has not done Physio or seen PM/R. He struggles with weight loss because of how the back pain restricts his activity      BP Readings from Last 3 Encounters:   09/23/20 134/86   09/17/20 126/86     Current Outpatient Medications   Medication Sig Dispense Refill    dilTIAZem (CARDIZEM CD) 120 MG extended release capsule Take 1 capsule by mouth daily 30 capsule 3     No current facility-administered medications for this visit. No Known Allergies    Past Medical & Surgical History:      Diagnosis Date    Atrial fibrillation ([address]     Retinal hemorrhage     right eye only    Tinnitus      Past Surgical History:   Procedure Laterality Date    FINGER SURGERY      removal of mass from knuckle       Family History:      Problem Relation Age of Onset    High Blood Pressure Father     Coronary Art Dis Father 36        CABG in 62s    Diabetes type 2  Father     Thyroid Disease Mother     No Known Problems Sister     No Known Problems Brother        Social History:  Social History     Tobacco Use    Smoking status: Never Smoker    Smokeless tobacco: Never Used   Substance Use Topics    Alcohol use: Yes     Comment: socuially        Immunization History   Administered Date(s) Administered    Influenza, Quadv, IM, PF (6 mo and older Fluzone, Flulaval, Fluarix, and 3 yrs and older Afluria) 09/23/2020       Review of Systems   Constitutional: Negative for chills and fever.    Respiratory: Negative for shortness of breath and wheezing. Cardiovascular: Negative for chest pain and palpitations. Gastrointestinal: Negative for diarrhea, nausea and vomiting. Musculoskeletal: Positive for back pain. Negative for gait problem and myalgias. Neurological: Negative for dizziness, weakness, numbness and headaches. VS:  /86   Pulse 72   Temp 98.4 °F (36.9 °C) (Temporal)   Ht 5' 9\" (1.753 m)   Wt 255 lb 3.2 oz (115.8 kg)   BMI 37.69 kg/m²     Physical Exam  Vitals signs reviewed. Constitutional:       General: He is not in acute distress. Appearance: Normal appearance. He is obese. He is not ill-appearing. HENT:      Head: Normocephalic and atraumatic. Cardiovascular:      Rate and Rhythm: Normal rate and regular rhythm. Pulses: Normal pulses. Heart sounds: Normal heart sounds. No murmur. Pulmonary:      Effort: Pulmonary effort is normal. No respiratory distress. Breath sounds: Normal breath sounds. No wheezing. Musculoskeletal: Normal range of motion. General: No swelling or tenderness. Comments: SLR negative   Skin:     General: Skin is warm and dry. Neurological:      General: No focal deficit present. Mental Status: He is alert and oriented to person, place, and time. Deep Tendon Reflexes: Reflexes normal.         Assessment/Plan:    1. Atrial fibrillation, unspecified type [facility])  Currently resolved and asymptomatic  Cont Cardizem  Follow with Cardiology    2. Chronic bilateral low back pain with bilateral sciatica  Currently manageable per patient  We will consider PM/R referral or Physio therapy if he feels this is warranted in the future  Will get MRI records from the [place]    3.  Prediabetes  Discussed his weight briefly  He is cognizant of this and is working on using dietary means to help with weight loss as his back pain restricts physical activity  Also with mildly elevated BP without symptoms, continue to monitor  We will check Lipids and recheck A1C at 6 month follow up      Follow up:  6 months. Will consider repeating A1C and checking lipids at that time    Patient agrees with the above stated plan.     Evangelist Smith MD  PGY-1 Family Medicine

## 2020-10-13 ENCOUNTER — OFFICE VISIT (OUTPATIENT)
Dept: CARDIOLOGY CLINIC | Age: 40
End: 2020-10-13
Payer: COMMERCIAL

## 2020-10-13 VITALS
SYSTOLIC BLOOD PRESSURE: 110 MMHG | BODY MASS INDEX: 37.69 KG/M2 | OXYGEN SATURATION: 97 % | RESPIRATION RATE: 18 BRPM | HEART RATE: 64 BPM | DIASTOLIC BLOOD PRESSURE: 86 MMHG | HEIGHT: 69 IN | WEIGHT: 254.5 LBS

## 2020-10-13 PROCEDURE — 99214 OFFICE O/P EST MOD 30 MIN: CPT | Performed by: INTERNAL MEDICINE

## 2020-10-13 PROCEDURE — 93005 ELECTROCARDIOGRAM TRACING: CPT | Performed by: INTERNAL MEDICINE

## 2020-10-13 NOTE — PROGRESS NOTES
OUTPATIENT CARDIOLOGY FOLLOW-UP    Name: Katia Mckeon    Age: 42 y.o. Primary Care Physician: Loyd Vidales MD    Date of Service: 10/13/2020    Chief Complaint:   Chief Complaint   Patient presents with    Follow-Up from Hospital     4 week follow up        Interim History:   Here for follow-up of paroxysmal atrial fibrillation. Recently hospitalized with ZAYNAB love, converted on his own. That was a second occurrence. Due to low chads score, no anticoagulation was started and I have placed him on oral diltiazem. A 7-day event monitor showed no evidence of atrial fibrillation or any ventricular arrhythmias. He is doing well. He denies any further episodes of palpitations. No chest pain or shortness of breath. He is scheduled for sleep study, but not until January 2021.     Review of Systems:   Negative except as scribed above    Past Medical History:  Past Medical History:   Diagnosis Date    Atrial fibrillation ([place])     Retinal hemorrhage     right eye only    Tinnitus        Past Surgical History:  Past Surgical History:   Procedure Laterality Date    FINGER SURGERY      removal of mass from knuckle       Family History:  Family History   Problem Relation Age of Onset    High Blood Pressure Father     Coronary Art Dis Father 36        CABG in 62s    Diabetes type 2  Father     Thyroid Disease Mother     No Known Problems Sister     No Known Problems Brother        Social History:  Social History     Tobacco Use    Smoking status: Never Smoker    Smokeless tobacco: Never Used   Substance Use Topics    Alcohol use: Yes     Comment: socuially     Drug use: Never        Allergies:  No Known Allergies    Current Medications:    Current Outpatient Medications:     dilTIAZem (CARDIZEM CD) 120 MG extended release capsule, Take 1 capsule by mouth daily, Disp: 30 capsule, Rfl: 3    Physical Exam:  /86 (Site: Left Upper Arm, Position: Sitting, Cuff Size: Large Adult)   Pulse 64   Resp 18 Normal axis normal intervals. No ST or T wave changes. Chest X-ray: 9/16/2020       Heart and pulmonary vascularity normal. Lungs clear. Costophrenic   angles sharp. Normal aorta.            Impression:         No acute cardiopulmonary findings.       Echocardiogram:   Transthoracic echo 9/17/2020   Summary   Normal left ventricular size and systolic function.   Ejection fraction is visually estimated at 60-65%.   Normal diastolic function.   No regional wall motion abnormalities seen.   Mild left ventricular concentric hypertrophy noted.   Normal right ventricular size and function.   Moderately enlarged right atrium.     Stress test:  na     Cardiac catheterization: na      Orders Placed This Encounter   Procedures    LIPID PANEL    EKG 12 lead        Requested Prescriptions      No prescriptions requested or ordered in this encounter        ASSESSMENT / PLAN:  1. Paroxysmal atrial fibrillation.  Admission 9/2020, spontaneous conversion with IV diltiazem.  [id number] Score 0.  Initial episode 2016 with spontaneous conversion at that time. 2. NSVT/ventricular triplets, resolved  3. History of retinal hemorrhage  4. Possible ADRIAN, sleep study pending  5. Obesity, BMI 37.8 kg/m²  6. Family history premature CAD  7. Borderline diabetes, A1c 6.2%    Recommendations:  He is doing well from cardiac standpoint maintaining sinus rhythm. He is at risk of recurrent arrhythmias as well as ASCVD. · Continue diltiazem  · Currently no indication for anticoagulation  · Aggressive risk factor modification especially with a borderline A1c of 6.2%  · Encouraged increase physical activity, exercise and lose weight and modify diet  · Will check a lipid panel  · Moderate alcohol and caffeine use  · Follow-up in 6 months or sooner if need arises    The patient's current medication list, allergies, problem list and results of all previously ordered testing were reviewed at today's visit.     Heather Kwok MD   [facility]) Cardiology

## 2020-12-22 ENCOUNTER — TELEPHONE (OUTPATIENT)
Dept: FAMILY MEDICINE CLINIC | Age: 40
End: 2020-12-22

## 2020-12-22 NOTE — TELEPHONE ENCOUNTER
Sleep study has been denied. Can you place an order for a [address] [place] [address] instead.  Thank you 1.62

## 2020-12-22 NOTE — TELEPHONE ENCOUNTER
Order placed for home sleep study    Electronically signed by Zayda Norwood MD on 12/22/2020 at 8:55 AM

## 2021-01-06 ENCOUNTER — TELEPHONE (OUTPATIENT)
Dept: CARDIOLOGY CLINIC | Age: 41
End: 2021-01-06

## 2021-01-06 NOTE — TELEPHONE ENCOUNTER
Since he has had multiple recurrences of atrial fibrillation, I would recommend staying on the diltiazem for now.

## 2021-01-06 NOTE — TELEPHONE ENCOUNTER
Kerwin Vidales To   [facility] Cardiology Clinical Staff Sent   1/6/2021 11:59 AM   Good Afternoon,     The Sleep study that was ordered last fall wasn't able to be scheduled until this month, my insurance ended up denying the sleep study and instead authorized a home sleep test. I was just notified this sleep test will cost over $300 out of pocket since my insurance deductible reset after the new year. My understanding is the sleep study was ordered to rule out sleep apnea which can cause AFIB. Since I don't experience any of the common symptoms of sleep apnea I don't feel this test is necessary and isn't worth the out of pocket cost.   I know you wanted to keep me on the Cardizem until after the sleep study, please let me know if I should stop taking it or continue taking until the next follow up?      Regards,     Pod Anna [id number]

## 2021-02-01 ENCOUNTER — OFFICE VISIT (OUTPATIENT)
Dept: PHYSICAL MEDICINE AND REHAB | Age: 41
End: 2021-02-01
Payer: COMMERCIAL

## 2021-02-01 VITALS
WEIGHT: 250 LBS | BODY MASS INDEX: 35.79 KG/M2 | TEMPERATURE: 97.8 F | HEIGHT: 70 IN | DIASTOLIC BLOOD PRESSURE: 76 MMHG | SYSTOLIC BLOOD PRESSURE: 124 MMHG

## 2021-02-01 DIAGNOSIS — M47.819 FACET ARTHROPATHY: ICD-10-CM

## 2021-02-01 DIAGNOSIS — G89.29 CHRONIC BILATERAL LOW BACK PAIN WITHOUT SCIATICA: Primary | ICD-10-CM

## 2021-02-01 DIAGNOSIS — M54.50 CHRONIC BILATERAL LOW BACK PAIN WITHOUT SCIATICA: Primary | ICD-10-CM

## 2021-02-01 PROCEDURE — 99244 OFF/OP CNSLTJ NEW/EST MOD 40: CPT | Performed by: PHYSICAL MEDICINE & REHABILITATION

## 2021-02-01 NOTE — PROGRESS NOTES
Cardiovascular: Denies CP, palpitations, edema      Gastrointestinal: Denies abdominal pain, N/V, constipation, or diarrhea    Genitourinary: Denies urinary symptoms    Neurologic: See HPI.     MSK: See HPI. Psychiatric: Denies sleep disturbance, anxiety, depression    Physical Exam:   Blood pressure 124/76, temperature 97.8 °F (36.6 °C), temperature source Temporal, height 5' 10\" (1.778 m), weight 250 lb (113.4 kg). PAIN: 5/10  GEN APPEARANCE: Pleasant, well developed, well nourished in no acute distress; Alert and Oriented; body habitus is average  PSYCH: Normal mood and affect   HEAD: Normocephalic; no facial asymetry noted  EYES: Pupils equal and reactive; EOMI  RESP: Breathing non-labored, no cyanosis  CARDIO: No pitting edema in bilateral lower extremities   SKIN: No lesions grossly visible on low back    MSK:    Lumbar/Hip Exam:      Inspection:  The Illiac crests were symmetrical.   Lumbar lordotic curvature was decreased   There was no evident scoliotic curve. There was no ecchymosis or erythema    Palpation:  Tenderness over sacral spine area: No  Tenderness at the SI joint: Yes, bilateral  Tenderness at the PSIS: No  Tenderness over the Gluteal area: No  Greater Trochanter Pain: No  Spinous process Pain: No.      There was generalized tenderness to the lumbar paraspinal region bilaterally. R    L  Motor:   Hip flexors  5/5    5/5  Quads  5/5    5/5  DF   5/5    5/5  EHL   5/5    5/5  Plantar Flexor 5/5    5/5    Sensory(LT):    Sensory was intact to bilateral L2-S2         R    L  Reflex Knee Jerk:  2    2  Medial Hamstring  2    2  Ankle Jerk:    2    2    Provacative testing:      R  L  Slump Test  neg  neg  Facet Grind  +  +  Rosa Elena's Finger (x2) +  +     ROM of Back:    Flexion: 30*  Extension:  10*    Gait: Reciprocal pattern with no assistive device, nonantalgic, appropriate step length and toe clearance, appropriate speed, no Trendelenburg.      Impression:   Galindo Watson is a 47 prepared using voice recognition software. Every attempt was made to ensure accuracy but there may be spelling, grammatical, and contextual errors.

## 2021-02-05 ENCOUNTER — HOSPITAL ENCOUNTER (OUTPATIENT)
Age: 41
Discharge: HOME | End: 2021-02-07
Payer: COMMERCIAL

## 2021-02-05 ENCOUNTER — HOSPITAL ENCOUNTER (OUTPATIENT)
Dept: GENERAL RADIOLOGY | Age: 41
Discharge: HOME | End: 2021-02-07
Payer: COMMERCIAL

## 2021-02-05 DIAGNOSIS — M54.50 CHRONIC BILATERAL LOW BACK PAIN WITHOUT SCIATICA: ICD-10-CM

## 2021-02-05 DIAGNOSIS — M47.819 FACET ARTHROPATHY: ICD-10-CM

## 2021-02-05 DIAGNOSIS — G89.29 CHRONIC BILATERAL LOW BACK PAIN WITHOUT SCIATICA: ICD-10-CM

## 2021-02-05 PROCEDURE — 72110 X-RAY EXAM L-2 SPINE 4/>VWS: CPT

## 2021-02-09 ENCOUNTER — EVALUATION (OUTPATIENT)
Dept: PHYSICAL THERAPY | Age: 41
End: 2021-02-09
Payer: COMMERCIAL

## 2021-02-09 DIAGNOSIS — G89.29 CHRONIC LOW BACK PAIN WITHOUT SCIATICA, UNSPECIFIED BACK PAIN LATERALITY: Primary | ICD-10-CM

## 2021-02-09 DIAGNOSIS — M54.50 CHRONIC LOW BACK PAIN WITHOUT SCIATICA, UNSPECIFIED BACK PAIN LATERALITY: Primary | ICD-10-CM

## 2021-02-09 PROCEDURE — 97161 PT EVAL LOW COMPLEX 20 MIN: CPT | Performed by: PHYSICAL THERAPIST

## 2021-02-09 NOTE — PROGRESS NOTES
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;     Previous PT: yes - helped    Medical Management for Current Problem: medications, pt reports having several ablations while at the [place]     Chief complaint: pain, decreased motion, difficulty with all prolonged activities    Behavior: condition is staying the same    Pain: constant  Current: 5/10     Best: 5/10     Worst:5/10    Symptom Type/Quality: dull, aching, throbbing  Location[de-identified] Back: lumbar region does not radiate         Provoking Activities/Positions: any/all prolonged postures/activities                 Relieving Activitie/Positions: heat, rest, meds    Disturbed Sleep: no  Bladder Dysfunction: no  Bowel Dysfunction: no     Imaging results: Xr Lumbar Spine (min 4 Views)    Result Date: 2021  EXAMINATION: XRAY VIEWS OF THE LUMBAR SPINE 2021 12:53 pm COMPARISON: None. HISTORY: ORDERING SYSTEM PROVIDED HISTORY: Chronic bilateral low back pain without sciatica TECHNOLOGIST PROVIDED HISTORY: Reason for exam:->low back pain FINDINGS: Vertebral bodies have normal height. Disc spaces are well maintained. Alignment is preserved frontal lateral projections. No significant hypertrophy of the facet joints are seen in the lumbar spine. The SI joints sacral wings and upper sacral spine appear unremarkable. Pedicle spinous process and transverse process of the vertebrae of the lumbar spine are of unremarkable appearance.   The    Unremarkable x-ray series of the lumbar spine. Past Medical History:  Past Medical History:   Diagnosis Date    Atrial fibrillation ([address]     Retinal hemorrhage     right eye only    Tinnitus      Past Surgical History:   Procedure Laterality Date    FINGER SURGERY      removal of mass from knuckle       Medications:   Current Outpatient Medications   Medication Sig Dispense Refill    Naproxen Sodium (ALEVE PO) Take by mouth      dilTIAZem (CARDIZEM CD) 120 MG extended release capsule Take 1 capsule by mouth daily 30 capsule 3     No current facility-administered medications for this visit.         Patient Goals: pain relief, return to prior activity, get back to normal, return to hobbies    Contraindications/Precautions: none    OBJECTIVE:     Observations: normal orthopedic exam    Inspection: level ASIS/PSIS/iliacs; ant pelvic tilt noted          Gait: normal mechanics noted B LE's/trunk     Range of Motion:  WNL for all ranges B LE's             lumbar AROM grossly limited ~ 50% WNL for all ranges with no c/o radiculopathy noted     Strength:   grossly 5/5 for all ranges B LE's       Palpation:  aching noted across B LB paraspinals L1-5 into B SI lines      Sensation:  WNL across  B LE's for all dermatomes     Special Tests:     [] Nerve Root Compression           Right []+ / [x] -    Left []+ / [x] -  [] Slump           Right []+ / [x] -    Left []+ / [x] -  [] FADIR          Right []+ / [] -    Left []+ / [] -  [] S-I Distraction          Right []+ / [] -    Left []+ / [] -     [] SLR           Right []+ / [x] -    Left []+ / [x] -     [] JIMENA          Right []+ / [] -    Left []+ / [] -  [] S-I Compression          Right []+ / [] -    Left []+ / [] -   [] Leg Length: []+ / [] -         ASSESSMENT     Outcome Measure:   Modified Oswestry 28% disability    Problems:    Pain reported 5/10   Decreased LB AROM for all ranges    Endurance  decreased for all prolonged activities       [x] There are no barriers affecting plan of care or recovery    [] Barriers to this patient's plan of care or recovery include. Domestic Concerns:  [x] No  [] Yes:    Goals (3-4 weeks)   Decrease reported pain to 0-3/10 with all prolonged functional activities    Restore LB AROM to WNL for all ranges    Improve endurance for all prolonged activities to GOOD/GOOD+   Israel Dumont I with HEP for home management of condition      Rehab Potential: [x] Good  [] Fair  [] Poor    PLAN       Treatment Plan:   [x] Therapeutic Exercise  [x] Therapeutic Activity  [x] Neuromuscular Re-education   [] Gait Training  [] Balance Training  [] Aerobic conditioning  [x] Manual Therapy  [] Massage/Fascial release   [] Work/Sport specific activities    [] Pain Neuroscience [x] Cold/hotpack  [] Vasocompression  [x] Electrical Stimulation  [] Lumbar/Cervical Traction  [] Ultrasound   [] Iontophoresis: 4 mg/mL Dexamethasone Sodium Phosphate 40-80 mAmin  [] Dry Needling      [x] Instruction in HEP      []  Medication allergies reviewed for use of Dexamethasone Sodium Phosphate 4mg/ml  with iontophoresis treatments. Patient is not allergic. The following CPT codes are likely to be used in the care of this patient: 33100 PT Evaluation: Low Complexity , 86176 Therapeutic Exercise , 78245 Therapeutic Activities , 53419 Manual Therapy ,  Electrical Stimulation      Suggested Professional Referral: [x] No  [] Yes:     Patient Education:  [x] Plans/Goals, Risks/Benefits discussed  [x] Home exercise program  Method of Education: [x] Verbal  [x] Demo  [x] Written  Comprehension of Education:  [x] Verbalizes understanding. [x] Demonstrates understanding. [] Needs Review. [] Demonstrates/verbalizes understanding of HEP/Ed previously given. Frequency:  2-3 days per week for 3-4 weeks    Patient understands diagnosis/prognosis and consents to treatment, plan and goals: [x] Yes    [] No     Thank you for the opportunity to work with your patient.   If you have questions or comments, please contact me at numbers listed above. Electronically signed by: Mortimer Bonine, PT         Please sign Physician's Certification and return to: [address]  [address]  Dept: [phone]  Dept Fax: [phone] PT DPT [id number]    [id number] Certification / Comments     Frequency/Duration 2-3 days per week for 3-4 weeks. Certification period from 2/9/2021  to 5/9/21. I have reviewed the Plan of Care established for skilled therapy services and certify that the services are required and that they will be provided while the patient is under my care.     Physician's Comments/Revisions:               Physician's Printed Name:                                           [de-identified] Signature:                                                               Date:

## 2021-02-09 NOTE — PROGRESS NOTES
[address] [facility] and [facility]   Phone: [phone]   Fax: [phone]      Physical Therapy Treatment Note    Date: 2021  Patient Name: Verna Méndez  : 1980   MRN: [id number]    Referring Provider: Lauren Short DO  [address] [facility],  [address] [facility] Diagnosis:  chronic low back pain     Outcome Measure:  Oswestry  28    S: see eval  O:  Time 2734-0980     Visit - Repeat outcome measure at mid point and end. Pain 5/10     Strength grossly 5/5 for all ranges B LE's     Palpation      ROM      Modalities      MH + ES            Education      Posture, HEP, injury description, exercise rationale      Stretching       rot st       SKTC      DKTC      piriformis st       Functional activities To aid in reaching , pushing, pulling tasks at home     ROWS: H  \"    ROWS: M  \"    ROWS: L  \"    Obliques - high  \"    Obliques - low  \"     THEREX     Bike            pelvic tilts                 bridges       seated hip add                        abd                        flex                         Triceps ext standing      Marching            Trunk ext TB      Trunk flex TB      Hip abd      Hip EXT      TG Squats            Lumbar extension      Prone press ups                        A:  Tolerated well. Above added to written HEP.   P: Continue with rehab plan  DARSHANA King      Treatment Charges: Mins Units   Initial Evaluation 28 1   Re-Evaluation     Ther Exercise         TE     Manual Therapy     MT     Ther Activities        TA     Gait Training          GT     Neuro Re-education NR     Modalities     Non-Billable Service Time     Other     Total Time/Units 28 1

## 2021-02-12 ENCOUNTER — TREATMENT (OUTPATIENT)
Dept: PHYSICAL THERAPY | Age: 41
End: 2021-02-12
Payer: COMMERCIAL

## 2021-02-12 DIAGNOSIS — G89.29 CHRONIC LOW BACK PAIN WITHOUT SCIATICA, UNSPECIFIED BACK PAIN LATERALITY: Primary | ICD-10-CM

## 2021-02-12 DIAGNOSIS — M54.50 CHRONIC LOW BACK PAIN WITHOUT SCIATICA, UNSPECIFIED BACK PAIN LATERALITY: Primary | ICD-10-CM

## 2021-02-12 PROCEDURE — 97112 NEUROMUSCULAR REEDUCATION: CPT | Performed by: PHYSICAL THERAPIST

## 2021-02-12 PROCEDURE — 97140 MANUAL THERAPY 1/> REGIONS: CPT | Performed by: PHYSICAL THERAPIST

## 2021-02-12 NOTE — PROGRESS NOTES
[address] [facility] and Rehabilitation   Phone: [phone]   Fax: [phone]      Physical Therapy Treatment Note    Date: 2021  Patient Name: Kerwin Vidales  : 1980   MRN: [id number]    Referring Provider: Vianey Tariq DO  [address] [facility],  [address] [facility] Diagnosis:  chronic low back pain     Outcome Measure:  Oswestry  28    S: pt c/o constant 5/10 LBP  O:  Time 6642-5954     Visit - Repeat outcome measure at mid point and end. Pain 5/10     Strength grossly 5/5 for all ranges B LE's     Palpation      ROM      Manual Therapy      [place] Mobs PA Mobs & transverse Mobs L1-L5. MT         Education      Posture, HEP, injury description, exercise rationale      Stretching       rot st       SKTC      DKTC      piriformis st       Functional activities To aid in reaching , pushing, pulling tasks at home     ROWS: H  \"    ROWS: M  \"    ROWS: L  \"    Obliques - high  \"    Obliques - low  \"     THEREX     Bike            glute bridge 1x 10reps  NR   glute bridge with hip add ball squeeze 1x 10reps  NR   glute bridge with band hip abd 1x 10reps Blue band NR   glute bridge with cuff weight 1x 10reps 10lb cuff wieght NR                     flex       Side-lying clamshell 1x 10reps R & L Blue band NR   Side-lying hip abd 1x 10reps R & L  NR   Prone hip ext 1x 10reps  NR   Prone off of table hip ext 1x 10reps R & L  NR   Band RLE & LLE step-outs forward, medial, lateral, & backward 1x 10reps  Blue band NR   Tubing trunk rotation 1x 10reps R & L Blue tubing NR   Tubing trunk rotation with upward swing 1x 10reps R & L Blue tubing NR   Tubing trunk rotation with downward swing 1x 10reps R & L Blue tubuing NR   Tubing rows high, mid, & low, reverse flys (sh horizontal abd), & sh ext mid & high (depression overhead). 1x 10reps  Silver tubing NR   Hip EXT      TG Squats            Lumbar extension      Prone press ups                        A:  Tolerated well.   Pt progressed to initiate exercise program today. The pt reported LBP was unchanged following Tx session. P: Continue with rehab plan & progress to include new prone & quadruped exercises for trunk stabilization.    Juliana Osborne PT, DPT, OHPT [id number]      Treatment Charges: Mins Units   Initial Evaluation     Re-Evaluation     Ther Exercise         TE     Manual Therapy     MT 12 1   Ther Activities        TA     Gait Training          GT     Neuro Re-education NR 33 2   Modalities     Non-Billable Service Time     Other     Total Time/Units 45 3

## 2021-02-16 ENCOUNTER — TREATMENT (OUTPATIENT)
Dept: PHYSICAL THERAPY | Age: 41
End: 2021-02-16
Payer: COMMERCIAL

## 2021-02-16 DIAGNOSIS — M54.50 CHRONIC LOW BACK PAIN WITHOUT SCIATICA, UNSPECIFIED BACK PAIN LATERALITY: Primary | ICD-10-CM

## 2021-02-16 DIAGNOSIS — G89.29 CHRONIC LOW BACK PAIN WITHOUT SCIATICA, UNSPECIFIED BACK PAIN LATERALITY: Primary | ICD-10-CM

## 2021-02-16 PROCEDURE — 97110 THERAPEUTIC EXERCISES: CPT | Performed by: PHYSICAL THERAPIST

## 2021-02-16 PROCEDURE — 97112 NEUROMUSCULAR REEDUCATION: CPT | Performed by: PHYSICAL THERAPIST

## 2021-02-16 NOTE — PROGRESS NOTES
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 O:  Time 2323-5568     Visit 3/6- Repeat outcome measure at mid point and end.     Pain 5/10     Strength grossly 5/5 for all ranges B LE's     Palpation      ROM      Manual Therapy       MT   MH Lumbar region 5min NB   Education      Posture, HEP, injury description, exercise rationale      Stretching       supine arom lumbar rotation R & L 3x 10reps  TE   SKTC 3x 30s R & L  TE   DKTC 3x 30s  TE   Stretches: supine glute/hip abductor stretch, supine strap hamstring stretch, supine strap IT band lateral hamstring stretch, supine strap medial hamstring/hip adductor stretch 3x 30s R & L  TE    Functional activities To aid in reaching , pushing, pulling tasks at home     ROWS: H  \"    ROWS: M  \"    ROWS: L  \"    Obliques - high  \"    Obliques - low  \"     THEREX     Bike 5min  TE         glute bridge with right & left march 1x 10reps  NR   glute bridge with hip add ball squeeze 2x 10reps  NR   glute bridge with band hip abd 2x 10reps Blue band NR   glute bridge with cuff weight 2x 10reps 10lb cuff wieght NR                           Side-lying clamshell 2x 10reps R & L purple band NR   Side-lying hip abd 2x 10reps R & L  NR   Prone hip ext 2x 10reps  NR   Prone off of table hip ext 2x 10reps R & L  NR   Band RLE & LLE step-outs forward, medial, lateral, & backward 1x 15reps  Blue band NR   Tubing trunk rotation 1x 15reps R & L Silver tubing NR   Tubing trunk rotation with upward swing 1x 15reps R & L Silver tubing NR   Tubing trunk rotation with downward swing 1x 15reps R & L Silver tubuing NR   Tubing rows high, mid, & low, reverse flys (sh horizontal abd), & sh ext mid & high (depression overhead). 1x 15reps  Silver tubing NR   Hip EXT      TG Squats            Lumbar extension      Prone press ups                        A:  Tolerated well. Pt progressed with today's Tx session to perform Silver Tubing with trunk rotation exercises, & increased reps for exercises today. Also, pt reported decreased LBP from 5/10 to 3/10 following today's Tx session. P: Continue with rehab plan & progress to include new prone & quadruped exercises for trunk stabilization.    Rose Mary Vang PT, DPT, OHPT [id number]      Treatment Charges: Mins Units   Initial Evaluation     Re-Evaluation     Ther Exercise         TE 19 1   Manual Therapy     MT     Ther Activities        TA     Gait Training          GT     Neuro Re-education NR 41 3   Modalities 5 NB   Non-Billable Service Time     Other     Total Time/Units 65 4

## 2021-02-16 NOTE — PROGRESS NOTES
[address] [facility] and Rehabilitation   Phone: [phone]   Fax: [phone]      Physical Therapy Treatment Note    Date: 2021  Patient Name: Eloy Hair  : 1980   MRN: [id number]    Referring Provider: Juli Guevara DO  [address] [facility],  [address] [facility] Diagnosis:  chronic low back pain     Outcome Measure:  Oswestry  28    S: pt c/o constant 5/10 LBP  O:  Time 7521-0743     Visit - Repeat outcome measure at mid point and end. Pain 5/10     Strength grossly 5/5 for all ranges B LE's     Palpation      ROM      Manual Therapy      [place] Mobs PA Mobs & transverse Mobs L1-L5. MT         Education      Posture, HEP, injury description, exercise rationale      Stretching       rot st       SKTC      DKTC      piriformis st       Functional activities To aid in reaching , pushing, pulling tasks at home     ROWS: H  \"    ROWS: M  \"    ROWS: L  \"    Obliques - high  \"    Obliques - low  \"     THEREX     Bike            glute bridge 1x 10reps  NR   glute bridge with hip add ball squeeze 1x 10reps  NR   glute bridge with band hip abd 1x 10reps Blue band NR   glute bridge with cuff weight 1x 10reps 10lb cuff wieght NR                     flex       Side-lying clamshell 1x 10reps R & L Blue band NR   Side-lying hip abd 1x 10reps R & L  NR   Prone hip ext 1x 10reps  NR   Prone off of table hip ext 1x 10reps R & L  NR   Band RLE & LLE step-outs forward, medial, lateral, & backward 1x 10reps  Blue band NR   Tubing trunk rotation 1x 10reps R & L Blue tubing NR   Tubing trunk rotation with upward swing 1x 10reps R & L Blue tubing NR   Tubing trunk rotation with downward swing 1x 10reps R & L Blue tubuing NR   Tubing rows high, mid, & low, reverse flys (sh horizontal abd), & sh ext mid & high (depression overhead).  1x 10reps  Silver tubing NR   Hip EXT      TG Squats            Lumbar extension      Prone press ups A:  Tolerated well. Pt progressed to initiate exercise program today. The pt reported LBP was unchanged following Tx session. P: Continue with rehab plan & progress to include new prone & quadruped exercises for trunk stabilization.    Dejah Joseph PT, DPT, OHPT [id number]      Treatment Charges: Mins Units   Initial Evaluation     Re-Evaluation     Ther Exercise         TE     Manual Therapy     MT 12 1   Ther Activities        TA     Gait Training          GT     Neuro Re-education NR 33 2   Modalities     Non-Billable Service Time     Other     Total Time/Units 45 3

## 2021-02-23 ENCOUNTER — TREATMENT (OUTPATIENT)
Dept: PHYSICAL THERAPY | Age: 41
End: 2021-02-23
Payer: COMMERCIAL

## 2021-02-23 DIAGNOSIS — M54.50 CHRONIC LOW BACK PAIN WITHOUT SCIATICA, UNSPECIFIED BACK PAIN LATERALITY: Primary | ICD-10-CM

## 2021-02-23 DIAGNOSIS — G89.29 CHRONIC LOW BACK PAIN WITHOUT SCIATICA, UNSPECIFIED BACK PAIN LATERALITY: Primary | ICD-10-CM

## 2021-02-23 PROCEDURE — 97110 THERAPEUTIC EXERCISES: CPT

## 2021-02-23 PROCEDURE — 97112 NEUROMUSCULAR REEDUCATION: CPT

## 2021-02-23 NOTE — PROGRESS NOTES
[address] [facility]   Phone: [phone]   Fax: [phone]      Physical Therapy Treatment Note    Date: 2021  Patient Name: Randolph Nelson  : 1980   MRN: [id number]    Referring Provider: Kelly Srivastava DO  [address] [facility],  [address] [facility]     Medical Diagnosis:  chronic low back pain     Outcome Measure:  Oswestry  28    S: Pt arrived reporting pain 4/10 low back. O:  Time 825-651     Visit -8 Repeat outcome measure at mid point and end. Pain 4/10     Strength grossly 5/5 for all ranges B LE's     Palpation      ROM      Manual Therapy       MT    NB   Education      Posture, HEP, injury description, exercise rationale      Stretching       supine arom lumbar rotation R & L 2 x 10reps  TE   SKTC 3x 30s R & L  TE   DKTC   TE   Stretches: supine glute/hip abductor stretch, supine strap hamstring stretch, supine strap IT band lateral hamstring stretch,  3x 30s R & L  TE    Functional activities To aid in reaching , pushing, pulling tasks at home     ROWS: H  \"    ROWS: M  \"    ROWS: L  \"    Obliques - high  \"    Obliques - low  \"     THEREX     Bike 10 min  TE         glute bridge with right & left march   NR   glute bridge with hip add ball squeeze 2x 10reps  NR   glute bridge with band hip abd 2x 10reps Blue band NR   glute bridge with cuff weight 10lb cuff wieght NR                           Side-lying clamshell 2x 10reps R & L purple band NR   Side-lying hip abd   NR   Prone hip ext   NR   Prone off of table hip ext   NR   Band RLE & LLE step-outs forward, medial, lateral, & backward  Blue band NR   Tubing trunk rotation Silver tubing NR   Tubing trunk rotation with upward swing Silver tubing NR   Tubing trunk rotation with downward swing Silver tubuing NR   Tubing rows high, mid, & low, reverse flys (sh horizontal abd), & sh ext mid & high (depression overhead).  Silver tubing NR   Hip EXT      TG Squats      Side plank R, L 3 x 10 sec hold     Quadruped alt arm/leg ext. 2 x 10     Plank 3 x 20 sec hold     Lumbar extension      Prone press ups                        A:  Tolerated well. Pain level same as prior to session. Advanced to quadruped and plank therex.    P: Continue with rehab plan     [facility] PTA [id number]       Treatment Charges: Mins Units   Initial Evaluation     Re-Evaluation     Ther Exercise         TE 13 2   Manual Therapy     MT     Ther Activities        TA     Gait Training          GT     Neuro Re-education NR 30 1   Modalities     Non-Billable Service Time     Other     Total Time/Units 43 3

## 2021-02-25 ENCOUNTER — TREATMENT (OUTPATIENT)
Dept: PHYSICAL THERAPY | Age: 41
End: 2021-02-25
Payer: COMMERCIAL

## 2021-02-25 DIAGNOSIS — M54.50 CHRONIC LOW BACK PAIN WITHOUT SCIATICA, UNSPECIFIED BACK PAIN LATERALITY: Primary | ICD-10-CM

## 2021-02-25 DIAGNOSIS — G89.29 CHRONIC LOW BACK PAIN WITHOUT SCIATICA, UNSPECIFIED BACK PAIN LATERALITY: Primary | ICD-10-CM

## 2021-02-25 PROCEDURE — 97112 NEUROMUSCULAR REEDUCATION: CPT

## 2021-02-25 PROCEDURE — 97110 THERAPEUTIC EXERCISES: CPT

## 2021-02-25 NOTE — PROGRESS NOTES
[address] [facility]   Phone: [phone]   Fax: [phone]      Physical Therapy Treatment Note    Date: 2021  Patient Name: Kallie Caban  : 1980   MRN: [id number]    Referring Provider: Tunde Hinojosa DO  [address] [facility],  [address] [facility]     Medical Diagnosis:  chronic low back pain     Outcome Measure:  Oswestry  28    S: Pt arrived reporting pain 4/10 low back. Reported soreness following previous treatment. O:  Time 277-169     Visit /6-8 Repeat outcome measure at mid point and end.     Pain 4/10     Strength grossly 5/5 for all ranges B LE's     Palpation      ROM      Manual Therapy       MT   MH NB   Education      Posture, HEP, injury description, exercise rationale      Stretching       supine arom lumbar rotation R & L 2 x 10reps  TE   SKTC 3x 30s R & L  TE   DKTC   TE   Stretches: supine glute/hip abductor stretch, supine strap hamstring stretch, supine strap IT band lateral hamstring stretch,    TE    Functional activities To aid in reaching , pushing, pulling tasks at home     ROWS: H  \"    ROWS: M  \"    ROWS: L  \"    Obliques - high  \"    Obliques - low  \"     THEREX     Bike 10 min  TE         glute bridge with right & left march   NR   glute bridge with hip add ball squeeze 2x 10reps  NR   glute bridge with band hip abd 2x 10reps Blue band NR   glute bridge with cuff weight 10lb cuff wieght NR                    squats  2 x 10      Side-lying clamshell 2x 10reps R & L purple band NR   Side-lying hip abd 2x 10reps R & L  NR   Prone hip ext 2x 10reps  NR   Prone off of table hip ext   NR   Band RLE & LLE step-outs forward, medial, lateral, & backward  Blue band NR   Tubing trunk rotation Silver tubing NR   Tubing trunk rotation with upward swing 1x 15reps R & L Silver tubing NR   Tubing trunk rotation with downward swing 1x 15reps R & LSilver tubuing NR   Tubing rows high, mid, & low, reverse flys (sh horizontal abd), & sh ext mid & high (depression overhead). Silver tubing NR   Hip EXT      TG Squats      Side plank R, L     Quadruped alt arm/leg ext. 2 x 10     Plank      Lumbar extension      Prone press ups                        A:  Tolerated well. Pt report pain same as prior to treatment.    P: Continue with rehab plan     [facility] PTA [id number]       Treatment Charges: Mins Units   Initial Evaluation     Re-Evaluation     Ther Exercise         TE 15 1   Manual Therapy     MT     Ther Activities        TA     Gait Training          GT     Neuro Re-education NR 30 2   Modalities     Non-Billable Service Time     Other     Total Time/Units 45 3

## 2021-03-02 ENCOUNTER — TREATMENT (OUTPATIENT)
Dept: PHYSICAL THERAPY | Age: 41
End: 2021-03-02

## 2021-03-09 ENCOUNTER — OFFICE VISIT (OUTPATIENT)
Dept: PHYSICAL MEDICINE AND REHAB | Age: 41
End: 2021-03-09
Payer: COMMERCIAL

## 2021-03-09 VITALS
BODY MASS INDEX: 35.79 KG/M2 | SYSTOLIC BLOOD PRESSURE: 118 MMHG | DIASTOLIC BLOOD PRESSURE: 78 MMHG | TEMPERATURE: 97.9 F | WEIGHT: 250 LBS | HEIGHT: 70 IN

## 2021-03-09 DIAGNOSIS — M47.819 FACET ARTHROPATHY: ICD-10-CM

## 2021-03-09 DIAGNOSIS — G89.29 CHRONIC BILATERAL LOW BACK PAIN WITHOUT SCIATICA: Primary | ICD-10-CM

## 2021-03-09 DIAGNOSIS — M54.50 CHRONIC BILATERAL LOW BACK PAIN WITHOUT SCIATICA: Primary | ICD-10-CM

## 2021-03-09 PROCEDURE — 99214 OFFICE O/P EST MOD 30 MIN: CPT | Performed by: PHYSICAL MEDICINE & REHABILITATION

## 2021-03-09 NOTE — PROGRESS NOTES
[facility] [place] [facility]  [address]  Phone: [phone]  Fax: [phone]    Follow Up Evaluation for Carola Yancey  : 1980  MRN: [id number]  PCP: Jefm Epley, MD  REF: No ref. provider found  Date of visit: 3/9/21  Last Visit: 21    As you know, this is a 47 y.o. male with pertinent past medical history of atrial fibrillation, right retinal hemorrhage, tinnitus. Chief Complaint   Patient presents with    Follow-up     patient would like to go over xray results and the 5 PT sessions he has completed PT is not helping- pt stated he sent MRI results through [facility] and didnt know if the doctor reviewed them - pain location bilateral lower back 5/10       HPI:   Patient presents today in follow-up regarding chronic low back pain. Please recall he is a [employer]  and has suffered multiple minor back injuries in the past - no serious injuries. He was being treated at the [facility] for several years and receiving \"RFAs every 6 months\" with improvements in pain. At last visit, referred him to physical therapy which was not very helpful. Pain is most bothersome during and after playing golf which is a hobby of his. Pain began 10+ years ago without major inciting event.  See above  Location: low back, radiation into buttock   Quality: Achy, throbbing pain daily. Sharp, shooting pain occasionally when \"throws back out\". Severity: 5/10.  Pain Alleviated by meds.   Aggravated by golf, certain activities. Timing: constant  Sensation: No numbness or tingling  Misc: Pain is worse after golf - daily hobby     PRIOR INJURIES/TREATMENT:  Ice/Heat: Yes, ice temporarily helpful  Brace: No  Medications:               Currently: Aleve, acetaminophen              Past: Opioids (sedating), muscle relaxants (sedating)  Physical Therapy: Just completed 5 sessions, not helpful.   Injection: Bilateral RFA q6-8 months  Prior Surgery in location of pain: No  Prior Fracture/Injury in location of pain: No     Falls: None recently     No new B/B changes. The prior workup has included: Xray, MRI. OARRS reviewed. Diagnostic Studies:  - XR lumbar spine from [facility] dated 2/5/2021 (reviewed personally on 3/9/2021) demonstrates:   FINDINGS:   Vertebral bodies have normal height.  Disc spaces are well maintained. Alignment is preserved frontal lateral projections. No significant hypertrophy of the facet joints are seen in the lumbar spine. The   SI joints sacral wings and upper sacral spine appear unremarkable. Pedicle spinous process and transverse process of the vertebrae of the lumbar   spine are of unremarkable appearance. - MRI lumbar spine from [address] [place] [address] dated 6/11/2014 (reviewed personally on 3/9/2021) demonstrates:   Findings:  Bone marrow signal is normal.  There is loss of teeth to bright signal intensity at L4-L5 and L5-S1 consistent with desiccation. Conus medullaris terminates at T12 and appears normal.    Axial imaging at L4-L5 demonstrate minimal circumferential disc bulge and mild degenerative facet arthropathy. There is no significant central canal stenosis. Axial imaging at L5-S1 demonstrate no significant disc bulge or protrusion. There is mild degenerative facet arthropathy. There is no significant central canal stenosis. There is mild bilateral neuroforaminal compromise. Impression:  Mild degenerative disease of the lumbar spine as described above. No Known Allergies    Current Outpatient Medications   Medication Sig Dispense Refill    Naproxen Sodium (ALEVE PO) Take by mouth      dilTIAZem (CARDIZEM CD) 120 MG extended release capsule Take 1 capsule by mouth daily 30 capsule 3     No current facility-administered medications for this visit.         Past Medical History:   Diagnosis Date    Atrial fibrillation ([address]     Retinal hemorrhage     right eye only    Tinnitus        Past Surgical History:   Procedure Laterality Date    FINGER SURGERY      removal of mass from knuckle       Social History     Tobacco Use    Smoking status: Never Smoker    Smokeless tobacco: Never Used   Substance Use Topics    Alcohol use: Yes     Comment: socuially     Drug use: Never        Functional Status: The patient is able to ambulate and perform activities of daily living without the use of an assistive device. ROS:    Constitutional: Denies fevers, chills    Neurologic: See HPI.     MSK: See HPI. Psychiatric: Denies sleep disturbance, anxiety, depression    Physical Exam:   Blood pressure 118/78, temperature 97.9 °F (36.6 °C), temperature source Temporal, height 5' 10\" (1.778 m), weight 250 lb (113.4 kg). PAIN: 5/10  GEN APPEARANCE: Pleasant, well developed, well nourished in no acute distress; Alert and Oriented; body habitus is average  PSYCH: Normal mood and affect   SKIN: No lesions grossly visible on low back    NEURO -   Strength:   R  L  Hip Flex  5  5  Knee Ext  5  5  Knee Flex  5 5  Ankle dorsi  5  5  EHL   5 5  Ankle Plantar  5  5    Sensory:  Intact to light touch in all lower extremity dermatomes. Reflexes:   R  L  Patellar  2 2   Medial Hamstring 2 2  Ankle Jerk  2 2    No BLE clonus or spasticity. Gait: Reciprocal pattern with no assistive device, nonantalgic, appropriate step length and toe clearance, appropriate speed, no Trendelenburg.     Impression:   Galindo Watson is a 47 y.o. male who presents with chronic low back pain secondary to minimal degenerative disc disease, mild facet arthropathy, and myofascial pain. 1. Chronic bilateral low back pain without sciatica    2. Facet arthropathy        Recommendations:  1. Discussion: I have discussed the natural history of the above diagnoses with him in detail, with more than 1/2 of the visit spent counseling him on the pathophysiology and treatment options.    2. Activity Modification: Patient to continue being as physically active as possible while avoiding complete inactivity. No current restrictions placed. 3. Medications: Reviewed medications and no changes made today. Continue gentle analgesics as needed for pain control. 4. Referrals: Pain management for chronic low back pain which has failed conservative treatment options for many years. 5. Images: Reviewed recent lumbar x-ray and old MRIs from the [facility] with patient. No additional imaging ordered. 6. Labs: None today. 7. DME: None today. 8. Injection: None today. Will defer to pain management. 9. Follow-up: As needed. At that time we will review progress with above interventions. The patient was educated about the diagnosis, prognosis, indications, risks and benefits of treatment. An opportunity to ask questions was given to the patient and questions were answered. The patient agreed to proceed with the recommended treatment as described above. Sincerely,     Teddy Juárez., DO  Board Certified Physical Medicine and Rehabilitation     Please note that the above documentation was prepared using voice recognition software. Every attempt was made to ensure accuracy but there may be spelling, grammatical, and contextual errors.

## 2021-03-22 NOTE — PROGRESS NOTES
[address] [facility]   Phone: [phone]   Fax: [phone]    Discharge Summary        Date: 3/22/2021  Patient Name: Bill Ramesh  : 1980            MRN: [id number]     Referring Provider: Nehemiah Kawasaki, DO  [address] [address] [facility] Diagnosis:  chronic low back pain     ATTENDANCE:  Patient attended 5 of 6 scheduled treatments from 2021  to 3/2/2021. TREATMENTS RECEIVED:  Therapeutic activity, therapeutic exercise, neuromuscular reeducation, HEP, manual      REASON FOR DISCHARGE: Pt called and canceled final visit stating PT was not helping and he was worse. He was returning to his referring doc for further evaluation. Self d/c    PATIENT EDUCATION/INSTRUCTIONS: continue HEP as instructed    RECOMMENDATIONS: call c questions or if additional services are warranted. Thank you for the opportunity to work with your patient. If you have questions or comments, please contact me at numbers listed above.       Paul Soni [address] , DPT PT [id number] 3/22/2021

## 2021-03-29 ENCOUNTER — PREP FOR PROCEDURE (OUTPATIENT)
Dept: PAIN MANAGEMENT | Age: 41
End: 2021-03-29

## 2021-03-29 ENCOUNTER — OFFICE VISIT (OUTPATIENT)
Dept: PAIN MANAGEMENT | Age: 41
End: 2021-03-29
Payer: COMMERCIAL

## 2021-03-29 VITALS
DIASTOLIC BLOOD PRESSURE: 82 MMHG | TEMPERATURE: 97.9 F | HEIGHT: 69 IN | SYSTOLIC BLOOD PRESSURE: 124 MMHG | RESPIRATION RATE: 16 BRPM | HEART RATE: 81 BPM | BODY MASS INDEX: 37.03 KG/M2 | WEIGHT: 250 LBS | OXYGEN SATURATION: 94 %

## 2021-03-29 DIAGNOSIS — M51.36 DDD (DEGENERATIVE DISC DISEASE), LUMBAR: ICD-10-CM

## 2021-03-29 DIAGNOSIS — E66.9 OBESITY, UNSPECIFIED CLASSIFICATION, UNSPECIFIED OBESITY TYPE, UNSPECIFIED WHETHER SERIOUS COMORBIDITY PRESENT: ICD-10-CM

## 2021-03-29 DIAGNOSIS — M47.817 LUMBOSACRAL SPONDYLOSIS WITHOUT MYELOPATHY: Primary | ICD-10-CM

## 2021-03-29 PROCEDURE — 99204 OFFICE O/P NEW MOD 45 MIN: CPT | Performed by: ANESTHESIOLOGY

## 2021-03-29 NOTE — PROGRESS NOTES
Patient:  RENAE Guzman 1980  Date of Service:  3/29/21      Do you currently have any of the following:    Fever: No  Headache:  No  Cough: No  Shortness of breath: No  Exposed to anyone with these symptoms: No       Patient presents with complaints of lower back pain pain that started 8 years ago and has been getting worse. He states the pain began following No specific cause    Pain is constant and is described as aching, throbbing, shooting and sharp. He rates the pain as a 8/10 on his worst day , 3/10 on his best day, and a 6/10 on average on the VAS scale. Pain does radiate to hips. He  does not have  of the . Alleviating factors include: ice and stretching. Aggravating factors include:  movement, walking, standing, sitting, lifting. He states that the pain does keep him from sleeping at night. He took his last dose of Tylenol yesterday. He is not on NSAIDS and  is not on anticoagulation medications to include none and is managed by . Previous treatments: Physical Therapy, RFA,  medications. and acupuncture. .      Personal Expectations from this treatment: increase activity and decrease pain    /82   Pulse 81   Temp 97.9 °F (36.6 °C) (Infrared)   Resp 16   Ht 5' 9\" (1.753 m)   Wt 250 lb (113.4 kg)   SpO2 94%   BMI 36.92 kg/m²     No LMP for male patient.

## 2021-03-29 NOTE — PROGRESS NOTES
[facility]  [facility], [address] [place] [address]  Dept: [phone]          Consult Note      Patient:  RENAE Snell 1980    Date of Service:  21     Requesting Physician:  Sindi House DO    Reason for Consult:      Patient presents with complaints of chronic low back pain    HISTORY OF PRESENT ILLNESS:      Mr. Oren Pitt is a 47 y.o. male presented today to [address]  for evaluation of  chronic low back pain for > 10 yrs. He used to work in [employer] and attributes the pain for the physical nature of his job in the past.    Prior lumbar spine interventions/ facet interventions had helped significantly. Pain is constant and is described as aching and throbbing. Pain does not radiate to lower extremities. He  does not have numbness, tingling, weakness of the lower extremities     Patient does not have bladder or bowel dysfunction. Alleviating factors include: rest, injections. Aggravating factors include: movement, walking, bending, lifting. Pain causes functional limitations/ limits Adl's : Yes     Nursing notes and details of the pain history reviewed. Please see intake notes for details. He has been evaluated by Dr. Luis Reynoso- treated conservatively and referred for interventions. Previous treatments:   Physical Therapy : yes, for > 6 weeks completed in 2021. Continues HEP regularly as tolerated. Chiropractic treatment: yes    Medications: - NSAID's : yes             - Membrane stabilizers : no            - Opioids : no            - Adjuvants or Others : yes    TENS Unit: no    Surgeries: no spine surgery    Interventional Pain procedures/ nerve blocks: yes - good pain relief. He has not been on anticoagulation medications. He has not been on herbal supplements. He is not diabetic.     H/O Smoking: denies  H/O alcohol abuse : denies  H/O Illicit drug use : denies    Employment: employed    Imaging:     X ray LS spine: 2/5/2021:  FINDINGS:   Vertebral bodies have normal height.  Disc spaces are well maintained. Alignment is preserved frontal lateral projections. No significant hypertrophy of the facet joints are seen in the lumbar spine. The   SI joints sacral wings and upper sacral spine appear unremarkable. Pedicle spinous process and transverse process of the vertebrae of the lumbar   spine are of unremarkable appearance.  The       Impression   Unremarkable x-ray series of the lumbar spine. MRI of LS spine: 2014- (scanned in media section 2/9/2021). Findings:  Bone marrow signal is normal.  There is loss of T2 bright signal intensity at L4-L5 and L5-S1 consistent with desiccation. Conus medullaris terminates at T12 and appears normal.    Axial imaging L3-4: No significant disc bulge or protrusion. NO significant central canal or foraminal compromise.     Axial imaging at L4-L5 demonstrate minimal circumferential disc bulge and mild degenerative facet arthropathy. There is no significant central canal stenosis.     Axial imaging at L5-S1 demonstrate no significant disc bulge or protrusion. There is mild degenerative facet arthropathy. There is no significant central canal stenosis. There is mild bilateral neuroforaminal compromise.     Impression:  Mild degenerative disease of the lumbar spine as described above. Past Medical History:   Diagnosis Date    Atrial fibrillation ([address]     Retinal hemorrhage     right eye only    Tinnitus        Past Surgical History:   Procedure Laterality Date    FINGER SURGERY      removal of mass from knuckle       Prior to Admission medications    Medication Sig Start Date End Date Taking?  Authorizing Provider   Naproxen Sodium (ALEVE PO) Take by mouth    Historical Provider, MD   dilTIAZem (CARDIZEM CD) 120 MG extended release capsule Take 1 capsule by mouth daily  Patient not taking: Reported on 3/29/2021 9/18/20   Arash Pack MD       No Known Allergies    Social History     Socioeconomic History    Marital status:      Spouse name: Not on file    Number of children: Not on file    Years of education: Not on file    Highest education level: Not on file   Occupational History    Not on file   Social Needs    Financial resource strain: Not on file    Food insecurity     Worry: Not on file     Inability: Not on file    Transportation needs     Medical: Not on file     Non-medical: Not on file   Tobacco Use    Smoking status: Never Smoker    Smokeless tobacco: Never Used   Substance and Sexual Activity    Alcohol use: Yes     Comment: socuially     Drug use: Never    Sexual activity: Not on file   Lifestyle    Physical activity     Days per week: Not on file     Minutes per session: Not on file    Stress: Not on file   Relationships    Social connections     Talks on phone: Not on file     Gets together: Not on file     Attends Tenriism service: Not on file     Active member of club or organization: Not on file     Attends meetings of clubs or organizations: Not on file     Relationship status: Not on file    Intimate partner violence     Fear of current or ex partner: Not on file     Emotionally abused: Not on file     Physically abused: Not on file     Forced sexual activity: Not on file   Other Topics Concern    Not on file   Social History Narrative    Not on file       Family History   Problem Relation Age of Onset    High Blood Pressure Father     Coronary Art Dis Father 36        CABG in 62s    Diabetes type 2  Father     Thyroid Disease Mother     Arthritis Mother     No Known Problems Sister     No Known Problems Brother        REVIEW OF SYSTEMS:     Patient specifically denies fever/chills, chest pain, shortness of breath, new bowel or bladder complaints. All other review of systems was negative.   Review of Systems - Documented reviewed    PHYSICAL EXAMINATION:      /82   Pulse 81   Temp 97.9 °F (36.6 °C) (Infrared)   Resp 16   Ht 5' 9\" (1.753 m)   Wt 250 lb (113.4 kg)   SpO2 94%   BMI 36.92 kg/m²     General:      General appearance:  Pleasant and well-hydrated, in no distress and A & O x 3  Build:Obese  Function: Rises from seated position easily and Moves about room without difficulty    HEENT:    Head:normocephalic, atraumatic    Lungs:    Breathing:normal breathing pattern     CVS:     RRR    Abdomen:    Shape:non-distended and normal    Cervical spine:    Inspection:normal  Palpation:tenderness paravertebral muscles, tenderness trapezium, left, right and positive. Range of motion:Normal flexion, extension, rotation bilaterally and is not painful. Spurling's: negative bilaterally    Thoracic spine:     Spine inspection:normal   Palpation:No tenderness over the midline and paraspinal area, bilaterally  Range of motion:normal in flexion, extension rotation bilateral and is not painful. Lumbar spine:    Spine inspection: Normal   Palpation: Tenderness paravertebral muscles Yes bilaterally  Range of motion: Decreased, flexion Decreased, Lateral bending, extension and rotation bilaterally reduced is painful.   Lumbar facet loading test is positive bilaterally  Sacroiliac joint tenderness No bilaterally  JIMENA test: negative bilaterally  Gaenslen's test:negative bilaterally   Piriformis tenderness: negative bilaterally  SLR : negative bilaterally    Musculoskeletal:    Trigger points no    Extremities:    Tremors:None bilaterally upper and lower  Edema:none x all 4 extremities    Neurological:    Sensory: Normal to light touch     Motor:   Right  5/5              Left  5/5               Right Bicep 5/5           Left Bicep 5/5              Right Triceps 5/5       Left Triceps 5/5          Right Deltoid 5/5     Left Deltoid 5/5                  Right Quadriceps 5/5          Left Quadriceps 5/5           Right Gastrocnemius 5/5    Left Gastrocnemius 5/5  Right Ant Tibialis 5/5  Left Ant Tibialis 5/5    Reflexes:    Right Brachioradialis reflex 2+  Left Brachioradialis reflex 2+  Right Biceps reflex 2+  Left Biceps reflex 2+  Right Triceps reflex 2+  Left Triceps reflex 2+  Right Quadriceps reflex 2+  Left Quadriceps reflex 2+  Right Achilles reflex 2+  Left Achilles reflex 2+    Gait:normal Yes    Dermatology:    Skin:no rashes or lesions noted    Assessment/Plan:     Diagnosis Orders   1. Lumbosacral spondylosis without myelopathy     2. DDD (degenerative disc disease), lumbar     3. Obesity, unspecified classification, unspecified obesity type, unspecified whether serious comorbidity present         47 y.o. male with H/o chronic low back pain for > 10 yrs. Pain is predominantly axial in nature with interventions of lumbar facet pain. Failed conservative treatment -  With recent PT / NSAID's > 6 weeks and continues HEP. Prior MRI in 2014 findings reviewed and discussed the findings with the patient: no significant NF stenosis or HNP. Has been evaluated by Dr. Emily Tim. Exam: Facet loading + bilaterally over the mid and lower lumbar area,    Plan:    B/l Lumbar facet MBNB under fluoroscopic guidance to address pain from facets L3-4, L4-5, L5-S1. RBA discussed and patient agreed. If short duration pain relief we will plan to do radiofrequency ablation plan RFA. Continue home exercise program as tolerated    Discussed about importance of weight loss on spine health. NSAID's on as needed basis. Prior notes and image findings reviewed. Urine screen today: no- UDS on 9/2020 reviewed- appropriate/ consistent    Counseling :    Patient encouraged to stay active and to watch/lose weight and to continue Regular home exercise program as tolerated - stretching / strengthening. Treatment plan discussed with the patient including medication and procedure side effects. Controlled Substances Monitoring:     [place] reviewed not on chronic opioids.     Raquel Bee MD      Dear  Norma,   Thank you for referring Mr. Geovany Lewis and allowing us to participate in his care. Please do not hesitate to contact me if you have any questions regarding his care.     Jong Garcia MD    CC:    Terri Guerin DO  [address] [facility],  [address] [facility]     [place] [facility] [place] [address] [address]

## 2021-03-30 ENCOUNTER — TELEPHONE (OUTPATIENT)
Dept: PAIN MANAGEMENT | Age: 41
End: 2021-03-30

## 2021-03-30 NOTE — TELEPHONE ENCOUNTER
Call to [facility] Washburn and message left that procedure was approved for 4/12/2021 and that the surgery center should call him a few days before for the pre op call and after 3:00 PM the business day before with the arrival time. Instructed Eva Morales to hold ibuprofen for 24 hours, naprosyn for 4 days and any aspirin containing products for 7 days. Instructed to call office back to verify he received message and if any questions.

## 2021-03-31 ENCOUNTER — TELEPHONE (OUTPATIENT)
Dept: PAIN MANAGEMENT | Age: 41
End: 2021-03-31

## 2021-03-31 NOTE — TELEPHONE ENCOUNTER
3-31-21-received a call from Mahnaz Wilkinson that he wants to cancel his 4-12-21 procedure with Dr Ebony Mina. [facility]. He also cancelled his followup appointment. Called surgery scheduling to notify them of the cancellation.     Bhaskar Low RN  Pain Management

## 2021-06-10 RX ORDER — DILTIAZEM HYDROCHLORIDE 120 MG/1
CAPSULE, COATED, EXTENDED RELEASE ORAL
Qty: 30 CAPSULE | Refills: 0 | OUTPATIENT
Start: 2021-06-10

## 2024-06-29 ENCOUNTER — APPOINTMENT (OUTPATIENT)
Dept: CT IMAGING | Age: 44
End: 2024-06-29
Payer: COMMERCIAL

## 2024-06-29 ENCOUNTER — HOSPITAL ENCOUNTER (EMERGENCY)
Age: 44
Discharge: HOME | End: 2024-06-30
Payer: COMMERCIAL

## 2024-06-29 VITALS
SYSTOLIC BLOOD PRESSURE: 156 MMHG | OXYGEN SATURATION: 99 % | HEART RATE: 89 BPM | WEIGHT: 225 LBS | RESPIRATION RATE: 16 BRPM | TEMPERATURE: 97.9 F | BODY MASS INDEX: 33.33 KG/M2 | HEIGHT: 69 IN | DIASTOLIC BLOOD PRESSURE: 95 MMHG

## 2024-06-29 DIAGNOSIS — M62.830 SPASM OF LUMBAR PARASPINOUS MUSCLE: Primary | ICD-10-CM

## 2024-06-29 PROCEDURE — 6360000002 HC RX W HCPCS: Performed by: PHYSICIAN ASSISTANT

## 2024-06-29 PROCEDURE — 6370000000 HC RX 637 (ALT 250 FOR IP): Performed by: PHYSICIAN ASSISTANT

## 2024-06-29 PROCEDURE — 99284 EMERGENCY DEPT VISIT MOD MDM: CPT

## 2024-06-29 PROCEDURE — 72131 CT LUMBAR SPINE W/O DYE: CPT

## 2024-06-29 PROCEDURE — 96372 THER/PROPH/DIAG INJ SC/IM: CPT

## 2024-06-29 RX ORDER — DEXAMETHASONE SODIUM PHOSPHATE 10 MG/ML
10 INJECTION INTRAMUSCULAR; INTRAVENOUS ONCE
Status: COMPLETED | OUTPATIENT
Start: 2024-06-29 | End: 2024-06-29

## 2024-06-29 RX ORDER — ONDANSETRON 4 MG/1
4 TABLET, ORALLY DISINTEGRATING ORAL ONCE
Status: COMPLETED | OUTPATIENT
Start: 2024-06-29 | End: 2024-06-29

## 2024-06-29 RX ORDER — MORPHINE SULFATE 4 MG/ML
4 INJECTION, SOLUTION INTRAMUSCULAR; INTRAVENOUS ONCE
Status: COMPLETED | OUTPATIENT
Start: 2024-06-29 | End: 2024-06-29

## 2024-06-29 RX ADMIN — MORPHINE SULFATE 4 MG: 4 INJECTION, SOLUTION INTRAMUSCULAR; INTRAVENOUS at 22:09

## 2024-06-29 RX ADMIN — DEXAMETHASONE SODIUM PHOSPHATE 10 MG: 10 INJECTION INTRAMUSCULAR; INTRAVENOUS at 22:09

## 2024-06-29 RX ADMIN — ONDANSETRON 4 MG: 4 TABLET, ORALLY DISINTEGRATING ORAL at 22:08

## 2024-06-29 ASSESSMENT — PAIN DESCRIPTION - LOCATION
LOCATION: BACK
LOCATION: BACK

## 2024-06-29 ASSESSMENT — PAIN - FUNCTIONAL ASSESSMENT: PAIN_FUNCTIONAL_ASSESSMENT: 0-10

## 2024-06-29 ASSESSMENT — PAIN DESCRIPTION - ORIENTATION: ORIENTATION: LEFT;RIGHT;LOWER

## 2024-06-29 ASSESSMENT — LIFESTYLE VARIABLES
HOW MANY STANDARD DRINKS CONTAINING ALCOHOL DO YOU HAVE ON A TYPICAL DAY: PATIENT DOES NOT DRINK
HOW OFTEN DO YOU HAVE A DRINK CONTAINING ALCOHOL: NEVER

## 2024-06-29 ASSESSMENT — PAIN SCALES - GENERAL
PAINLEVEL_OUTOF10: 6
PAINLEVEL_OUTOF10: 6

## 2024-06-30 PROCEDURE — 6370000000 HC RX 637 (ALT 250 FOR IP): Performed by: PHYSICIAN ASSISTANT

## 2024-06-30 RX ORDER — METHOCARBAMOL 750 MG/1
750 TABLET, FILM COATED ORAL 4 TIMES DAILY
Qty: 40 TABLET | Refills: 0 | Status: SHIPPED | OUTPATIENT
Start: 2024-06-30 | End: 2024-07-10

## 2024-06-30 RX ORDER — OXYCODONE HYDROCHLORIDE AND ACETAMINOPHEN 5; 325 MG/1; MG/1
1 TABLET ORAL ONCE
Status: COMPLETED | OUTPATIENT
Start: 2024-06-30 | End: 2024-06-30

## 2024-06-30 RX ORDER — OXYCODONE HYDROCHLORIDE AND ACETAMINOPHEN 5; 325 MG/1; MG/1
1 TABLET ORAL EVERY 6 HOURS PRN
Qty: 12 TABLET | Refills: 0 | Status: SHIPPED | OUTPATIENT
Start: 2024-06-30 | End: 2024-07-03

## 2024-06-30 RX ORDER — PREDNISONE 20 MG/1
TABLET ORAL
Qty: 18 TABLET | Refills: 0 | Status: SHIPPED | OUTPATIENT
Start: 2024-06-30 | End: 2024-07-10

## 2024-06-30 RX ADMIN — OXYCODONE HYDROCHLORIDE AND ACETAMINOPHEN 1 TABLET: 5; 325 TABLET ORAL at 00:55

## 2024-06-30 NOTE — ED PROVIDER NOTES
condition is stable    New Medications     Discharge Medication List as of 6/30/2024  1:06 AM        START taking these medications    Details   predniSONE (DELTASONE) 20 MG tablet Sig.: Take 60mg  Po qd x 3 days, then 40mg po qd x3 days, then 20mg po qd x 3 days. QS x 9 days, Disp-18 tablet, R-0Normal      oxyCODONE-acetaminophen (PERCOCET) 5-325 MG per tablet Take 1 tablet by mouth every 6 hours as needed for Pain for up to 3 days. Intended supply: 3 days. Take lowest dose possible to manage pain Max Daily Amount: 4 tablets, Disp-12 tablet, R-0Normal      methocarbamol (ROBAXIN-750) 750 MG tablet Take 1 tablet by mouth 4 times daily for 10 days, Disp-40 tablet, R-0Normal           Electronically signed by Elizabeth Pichardo PA-C   DD: 6/30/24  **This report was transcribed using voice recognition software. Every effort was made to ensure accuracy; however, inadvertent computerized transcription errors may be present.  END OF ED PROVIDER NOTE

## 2024-07-01 ENCOUNTER — HOSPITAL ENCOUNTER (OUTPATIENT)
Dept: MRI IMAGING | Age: 44
Discharge: HOME | End: 2024-07-03
Attending: FAMILY MEDICINE
Payer: COMMERCIAL

## 2024-07-01 ENCOUNTER — OFFICE VISIT (OUTPATIENT)
Dept: FAMILY MEDICINE CLINIC | Age: 44
End: 2024-07-01
Payer: COMMERCIAL

## 2024-07-01 ENCOUNTER — TELEPHONE (OUTPATIENT)
Dept: FAMILY MEDICINE CLINIC | Age: 44
End: 2024-07-01

## 2024-07-01 VITALS
OXYGEN SATURATION: 100 % | DIASTOLIC BLOOD PRESSURE: 90 MMHG | HEIGHT: 69 IN | SYSTOLIC BLOOD PRESSURE: 140 MMHG | WEIGHT: 246 LBS | BODY MASS INDEX: 36.43 KG/M2 | TEMPERATURE: 60 F

## 2024-07-01 DIAGNOSIS — M54.16 LUMBAR RADICULOPATHY: ICD-10-CM

## 2024-07-01 DIAGNOSIS — M54.16 LUMBAR RADICULOPATHY: Primary | ICD-10-CM

## 2024-07-01 DIAGNOSIS — Z76.89 ENCOUNTER TO ESTABLISH CARE: ICD-10-CM

## 2024-07-01 PROCEDURE — 99214 OFFICE O/P EST MOD 30 MIN: CPT | Performed by: FAMILY MEDICINE

## 2024-07-01 PROCEDURE — 72148 MRI LUMBAR SPINE W/O DYE: CPT

## 2024-07-01 RX ORDER — DULOXETIN HYDROCHLORIDE 60 MG/1
60 CAPSULE, DELAYED RELEASE ORAL DAILY
COMMUNITY
Start: 2024-06-10

## 2024-07-01 RX ORDER — CLONAZEPAM 1 MG/1
1 TABLET ORAL
COMMUNITY
Start: 2024-06-03

## 2024-07-01 RX ORDER — CARBAMAZEPINE 200 MG/1
200 TABLET ORAL 2 TIMES DAILY
COMMUNITY
Start: 2024-05-21 | End: 2025-05-22

## 2024-07-01 RX ORDER — GABAPENTIN 300 MG/1
900 CAPSULE ORAL 3 TIMES DAILY
COMMUNITY
Start: 2024-05-06

## 2024-07-01 SDOH — ECONOMIC STABILITY: FOOD INSECURITY: WITHIN THE PAST 12 MONTHS, YOU WORRIED THAT YOUR FOOD WOULD RUN OUT BEFORE YOU GOT MONEY TO BUY MORE.: NEVER TRUE

## 2024-07-01 SDOH — HEALTH STABILITY: PHYSICAL HEALTH: ON AVERAGE, HOW MANY MINUTES DO YOU ENGAGE IN EXERCISE AT THIS LEVEL?: 30 MIN

## 2024-07-01 SDOH — ECONOMIC STABILITY: FOOD INSECURITY: WITHIN THE PAST 12 MONTHS, THE FOOD YOU BOUGHT JUST DIDN'T LAST AND YOU DIDN'T HAVE MONEY TO GET MORE.: NEVER TRUE

## 2024-07-01 SDOH — ECONOMIC STABILITY: INCOME INSECURITY: HOW HARD IS IT FOR YOU TO PAY FOR THE VERY BASICS LIKE FOOD, HOUSING, MEDICAL CARE, AND HEATING?: NOT HARD AT ALL

## 2024-07-01 SDOH — HEALTH STABILITY: PHYSICAL HEALTH: ON AVERAGE, HOW MANY DAYS PER WEEK DO YOU ENGAGE IN MODERATE TO STRENUOUS EXERCISE (LIKE A BRISK WALK)?: 3 DAYS

## 2024-07-01 SDOH — ECONOMIC STABILITY: HOUSING INSECURITY
IN THE LAST 12 MONTHS, WAS THERE A TIME WHEN YOU DID NOT HAVE A STEADY PLACE TO SLEEP OR SLEPT IN A SHELTER (INCLUDING NOW)?: NO

## 2024-07-01 NOTE — TELEPHONE ENCOUNTER
Dr Thomas called to request an establishing appt for Yogi kim/ Dr Lopez. He injured his back and was SEB yesterday. CT was preformed, Dr Thomas believes an MRI is needed.     Per Dr Lopez, okay to add to schedule.

## 2024-07-01 NOTE — PROGRESS NOTES
Establish care:  Yogi Cool is a 43 y.o. male, who presents to the office today for establishment of care.     Past Medical History:   Diagnosis Date    Atrial fibrillation (HCC)     Retinal hemorrhage     right eye only    Tinnitus         No Known Allergies    Current Outpatient Medications on File Prior to Visit   Medication Sig Dispense Refill    DULoxetine (CYMBALTA) 60 MG extended release capsule Take 1 capsule by mouth daily      clonazePAM (KLONOPIN) 1 MG tablet Take 1 tablet by mouth nightly.      gabapentin (NEURONTIN) 300 MG capsule Take 3 capsules by mouth 3 times daily.      carBAMazepine (TEGRETOL) 200 MG tablet Take 1 tablet by mouth 2 times daily      Semaglutide-Weight Management (WEGOVY) 2.4 MG/0.75ML SOAJ SC injection Inject 2.4 mg into the skin every 7 days      predniSONE (DELTASONE) 20 MG tablet Sig.: Take 60mg  Po qd x 3 days, then 40mg po qd x3 days, then 20mg po qd x 3 days. QS x 9 days 18 tablet 0    methocarbamol (ROBAXIN-750) 750 MG tablet Take 1 tablet by mouth 4 times daily for 10 days 40 tablet 0    Naproxen Sodium (ALEVE PO) Take by mouth       No current facility-administered medications on file prior to visit.         Family History   Problem Relation Age of Onset    High Blood Pressure Father     Coronary Art Dis Father 40        CABG in 60s    Diabetes type 2  Father     Thyroid Disease Mother     Arthritis Mother     No Known Problems Sister     No Known Problems Brother        Past Surgical History:   Procedure Laterality Date    FINGER SURGERY      removal of mass from knuckle       Social History     Socioeconomic History    Marital status:    Tobacco Use    Smoking status: Never    Smokeless tobacco: Never   Vaping Use    Vaping Use: Never used   Substance and Sexual Activity    Alcohol use: Yes     Comment: socuially     Drug use: Never     Social Determinants of Health     Financial Resource Strain: Low Risk  (7/1/2024)    Overall Financial Resource Strain

## 2024-07-03 ENCOUNTER — TELEPHONE (OUTPATIENT)
Dept: FAMILY MEDICINE CLINIC | Age: 44
End: 2024-07-03

## 2024-07-03 NOTE — TELEPHONE ENCOUNTER
Yogi calling for the results of the MRI that was done at [facility] recently.